# Patient Record
Sex: FEMALE | Race: WHITE | NOT HISPANIC OR LATINO
[De-identification: names, ages, dates, MRNs, and addresses within clinical notes are randomized per-mention and may not be internally consistent; named-entity substitution may affect disease eponyms.]

---

## 2019-04-07 ENCOUNTER — RESULT REVIEW (OUTPATIENT)
Age: 24
End: 2019-04-07

## 2020-02-09 ENCOUNTER — FORM ENCOUNTER (OUTPATIENT)
Age: 25
End: 2020-02-09

## 2020-02-10 ENCOUNTER — TRANSCRIPTION ENCOUNTER (OUTPATIENT)
Age: 25
End: 2020-02-10

## 2020-02-10 ENCOUNTER — APPOINTMENT (OUTPATIENT)
Dept: UROLOGY | Facility: CLINIC | Age: 25
End: 2020-02-10
Payer: COMMERCIAL

## 2020-02-10 ENCOUNTER — APPOINTMENT (OUTPATIENT)
Dept: CT IMAGING | Facility: IMAGING CENTER | Age: 25
End: 2020-02-10
Payer: COMMERCIAL

## 2020-02-10 ENCOUNTER — OUTPATIENT (OUTPATIENT)
Dept: OUTPATIENT SERVICES | Facility: HOSPITAL | Age: 25
LOS: 1 days | End: 2020-02-10
Payer: COMMERCIAL

## 2020-02-10 VITALS
SYSTOLIC BLOOD PRESSURE: 122 MMHG | WEIGHT: 125 LBS | RESPIRATION RATE: 17 BRPM | HEIGHT: 66 IN | BODY MASS INDEX: 20.09 KG/M2 | DIASTOLIC BLOOD PRESSURE: 80 MMHG | TEMPERATURE: 98.2 F | HEART RATE: 78 BPM

## 2020-02-10 DIAGNOSIS — R10.9 UNSPECIFIED ABDOMINAL PAIN: ICD-10-CM

## 2020-02-10 PROCEDURE — 74176 CT ABD & PELVIS W/O CONTRAST: CPT | Mod: 26

## 2020-02-10 PROCEDURE — 74176 CT ABD & PELVIS W/O CONTRAST: CPT

## 2020-02-10 PROCEDURE — 99204 OFFICE O/P NEW MOD 45 MIN: CPT

## 2020-02-10 NOTE — PHYSICAL EXAM
[General Appearance - Well Developed] : well developed [Heart Rate And Rhythm] : Heart rate and rhythm were normal [] : no respiratory distress [Abdomen Soft] : soft [Normal Station and Gait] : the gait and station were normal for the patient's age [Skin Color & Pigmentation] : normal skin color and pigmentation [Oriented To Time, Place, And Person] : oriented to person, place, and time

## 2020-02-12 LAB
APPEARANCE: CLEAR
BACTERIA UR CULT: NORMAL
BACTERIA: ABNORMAL
BILIRUBIN URINE: NEGATIVE
BLOOD URINE: NEGATIVE
COLOR: NORMAL
GLUCOSE QUALITATIVE U: NEGATIVE
HYALINE CASTS: 1 /LPF
KETONES URINE: NEGATIVE
LEUKOCYTE ESTERASE URINE: ABNORMAL
MICROSCOPIC-UA: NORMAL
NITRITE URINE: NEGATIVE
PH URINE: 6.5
PROTEIN URINE: NEGATIVE
RED BLOOD CELLS URINE: 3 /HPF
SPECIFIC GRAVITY URINE: 1.01
SQUAMOUS EPITHELIAL CELLS: 4 /HPF
UROBILINOGEN URINE: NORMAL
WHITE BLOOD CELLS URINE: 6 /HPF

## 2020-02-22 ENCOUNTER — EMERGENCY (EMERGENCY)
Facility: HOSPITAL | Age: 25
LOS: 1 days | Discharge: HOME | End: 2020-02-22
Attending: EMERGENCY MEDICINE
Payer: COMMERCIAL

## 2020-02-22 VITALS
OXYGEN SATURATION: 99 % | DIASTOLIC BLOOD PRESSURE: 80 MMHG | SYSTOLIC BLOOD PRESSURE: 116 MMHG | TEMPERATURE: 98 F | HEART RATE: 89 BPM | RESPIRATION RATE: 18 BRPM

## 2020-02-22 VITALS
HEART RATE: 79 BPM | RESPIRATION RATE: 17 BRPM | DIASTOLIC BLOOD PRESSURE: 74 MMHG | OXYGEN SATURATION: 100 % | WEIGHT: 125 LBS | SYSTOLIC BLOOD PRESSURE: 114 MMHG | TEMPERATURE: 98 F | HEIGHT: 66 IN

## 2020-02-22 LAB
ALBUMIN SERPL ELPH-MCNC: 4.3 G/DL — SIGNIFICANT CHANGE UP (ref 3.3–5)
ALP SERPL-CCNC: 49 U/L — SIGNIFICANT CHANGE UP (ref 40–120)
ALT FLD-CCNC: 25 U/L — SIGNIFICANT CHANGE UP (ref 10–45)
ANION GAP SERPL CALC-SCNC: 15 MMOL/L — SIGNIFICANT CHANGE UP (ref 5–17)
APPEARANCE UR: CLEAR — SIGNIFICANT CHANGE UP
AST SERPL-CCNC: 20 U/L — SIGNIFICANT CHANGE UP (ref 10–40)
BASE EXCESS BLDV CALC-SCNC: -2.2 MMOL/L — LOW (ref -2–2)
BASOPHILS # BLD AUTO: 0.05 K/UL — SIGNIFICANT CHANGE UP (ref 0–0.2)
BASOPHILS NFR BLD AUTO: 0.3 % — SIGNIFICANT CHANGE UP (ref 0–2)
BILIRUB SERPL-MCNC: 0.5 MG/DL — SIGNIFICANT CHANGE UP (ref 0.2–1.2)
BILIRUB UR-MCNC: NEGATIVE — SIGNIFICANT CHANGE UP
BUN SERPL-MCNC: 10 MG/DL — SIGNIFICANT CHANGE UP (ref 7–23)
CA-I SERPL-SCNC: 1.1 MMOL/L — LOW (ref 1.12–1.3)
CALCIUM SERPL-MCNC: 9.3 MG/DL — SIGNIFICANT CHANGE UP (ref 8.4–10.5)
CHLORIDE BLDV-SCNC: 116 MMOL/L — HIGH (ref 96–108)
CHLORIDE SERPL-SCNC: 106 MMOL/L — SIGNIFICANT CHANGE UP (ref 96–108)
CO2 BLDV-SCNC: 25 MMOL/L — SIGNIFICANT CHANGE UP (ref 22–30)
CO2 SERPL-SCNC: 20 MMOL/L — LOW (ref 22–31)
COLOR SPEC: YELLOW — SIGNIFICANT CHANGE UP
CREAT SERPL-MCNC: 0.82 MG/DL — SIGNIFICANT CHANGE UP (ref 0.5–1.3)
DIFF PNL FLD: NEGATIVE — SIGNIFICANT CHANGE UP
EOSINOPHIL # BLD AUTO: 0.04 K/UL — SIGNIFICANT CHANGE UP (ref 0–0.5)
EOSINOPHIL NFR BLD AUTO: 0.2 % — SIGNIFICANT CHANGE UP (ref 0–6)
GAS PNL BLDV: 137 MMOL/L — SIGNIFICANT CHANGE UP (ref 135–145)
GAS PNL BLDV: SIGNIFICANT CHANGE UP
GAS PNL BLDV: SIGNIFICANT CHANGE UP
GLUCOSE BLDV-MCNC: 93 MG/DL — SIGNIFICANT CHANGE UP (ref 70–99)
GLUCOSE SERPL-MCNC: 110 MG/DL — HIGH (ref 70–99)
GLUCOSE UR QL: NEGATIVE — SIGNIFICANT CHANGE UP
HCG SERPL-ACNC: <2 MIU/ML — SIGNIFICANT CHANGE UP
HCO3 BLDV-SCNC: 23 MMOL/L — SIGNIFICANT CHANGE UP (ref 21–29)
HCT VFR BLD CALC: 39.3 % — SIGNIFICANT CHANGE UP (ref 34.5–45)
HCT VFR BLD CALC: 47.4 % — HIGH (ref 34.5–45)
HCT VFR BLDA CALC: 40 % — SIGNIFICANT CHANGE UP (ref 39–50)
HGB BLD CALC-MCNC: 13.1 G/DL — SIGNIFICANT CHANGE UP (ref 11.5–15.5)
HGB BLD-MCNC: 12.7 G/DL — SIGNIFICANT CHANGE UP (ref 11.5–15.5)
HGB BLD-MCNC: 15.1 G/DL — SIGNIFICANT CHANGE UP (ref 11.5–15.5)
IMM GRANULOCYTES NFR BLD AUTO: 0.6 % — SIGNIFICANT CHANGE UP (ref 0–1.5)
KETONES UR-MCNC: ABNORMAL
LACTATE BLDV-MCNC: 1.1 MMOL/L — SIGNIFICANT CHANGE UP (ref 0.7–2)
LEUKOCYTE ESTERASE UR-ACNC: NEGATIVE — SIGNIFICANT CHANGE UP
LYMPHOCYTES # BLD AUTO: 0.84 K/UL — LOW (ref 1–3.3)
LYMPHOCYTES # BLD AUTO: 4.8 % — LOW (ref 13–44)
MCHC RBC-ENTMCNC: 30.9 PG — SIGNIFICANT CHANGE UP (ref 27–34)
MCHC RBC-ENTMCNC: 31.6 PG — SIGNIFICANT CHANGE UP (ref 27–34)
MCHC RBC-ENTMCNC: 31.9 GM/DL — LOW (ref 32–36)
MCHC RBC-ENTMCNC: 32.3 GM/DL — SIGNIFICANT CHANGE UP (ref 32–36)
MCV RBC AUTO: 97.1 FL — SIGNIFICANT CHANGE UP (ref 80–100)
MCV RBC AUTO: 97.8 FL — SIGNIFICANT CHANGE UP (ref 80–100)
MONOCYTES # BLD AUTO: 1.03 K/UL — HIGH (ref 0–0.9)
MONOCYTES NFR BLD AUTO: 5.8 % — SIGNIFICANT CHANGE UP (ref 2–14)
NEUTROPHILS # BLD AUTO: 15.57 K/UL — HIGH (ref 1.8–7.4)
NEUTROPHILS NFR BLD AUTO: 88.3 % — HIGH (ref 43–77)
NITRITE UR-MCNC: NEGATIVE — SIGNIFICANT CHANGE UP
NRBC # BLD: 0 /100 WBCS — SIGNIFICANT CHANGE UP (ref 0–0)
NRBC # BLD: 0 /100 WBCS — SIGNIFICANT CHANGE UP (ref 0–0)
PCO2 BLDV: 45 MMHG — SIGNIFICANT CHANGE UP (ref 35–50)
PH BLDV: 7.33 — LOW (ref 7.35–7.45)
PH UR: 6.5 — SIGNIFICANT CHANGE UP (ref 5–8)
PLATELET # BLD AUTO: 174 K/UL — SIGNIFICANT CHANGE UP (ref 150–400)
PLATELET # BLD AUTO: 250 K/UL — SIGNIFICANT CHANGE UP (ref 150–400)
PO2 BLDV: 30 MMHG — SIGNIFICANT CHANGE UP (ref 25–45)
POTASSIUM BLDV-SCNC: 3.8 MMOL/L — SIGNIFICANT CHANGE UP (ref 3.5–5.3)
POTASSIUM SERPL-MCNC: 4 MMOL/L — SIGNIFICANT CHANGE UP (ref 3.5–5.3)
POTASSIUM SERPL-SCNC: 4 MMOL/L — SIGNIFICANT CHANGE UP (ref 3.5–5.3)
PROT SERPL-MCNC: 7.7 G/DL — SIGNIFICANT CHANGE UP (ref 6–8.3)
PROT UR-MCNC: SIGNIFICANT CHANGE UP
RBC # BLD: 4.02 M/UL — SIGNIFICANT CHANGE UP (ref 3.8–5.2)
RBC # BLD: 4.88 M/UL — SIGNIFICANT CHANGE UP (ref 3.8–5.2)
RBC # FLD: 13 % — SIGNIFICANT CHANGE UP (ref 10.3–14.5)
RBC # FLD: 13.1 % — SIGNIFICANT CHANGE UP (ref 10.3–14.5)
SAO2 % BLDV: 46 % — LOW (ref 67–88)
SODIUM SERPL-SCNC: 141 MMOL/L — SIGNIFICANT CHANGE UP (ref 135–145)
SP GR SPEC: 1.02 — SIGNIFICANT CHANGE UP (ref 1.01–1.02)
UROBILINOGEN FLD QL: NEGATIVE — SIGNIFICANT CHANGE UP
WBC # BLD: 11.44 K/UL — HIGH (ref 3.8–10.5)
WBC # BLD: 17.63 K/UL — HIGH (ref 3.8–10.5)
WBC # FLD AUTO: 11.44 K/UL — HIGH (ref 3.8–10.5)
WBC # FLD AUTO: 17.63 K/UL — HIGH (ref 3.8–10.5)

## 2020-02-22 PROCEDURE — 82803 BLOOD GASES ANY COMBINATION: CPT

## 2020-02-22 PROCEDURE — 82565 ASSAY OF CREATININE: CPT

## 2020-02-22 PROCEDURE — 82435 ASSAY OF BLOOD CHLORIDE: CPT

## 2020-02-22 PROCEDURE — 84132 ASSAY OF SERUM POTASSIUM: CPT

## 2020-02-22 PROCEDURE — 82947 ASSAY GLUCOSE BLOOD QUANT: CPT

## 2020-02-22 PROCEDURE — 84702 CHORIONIC GONADOTROPIN TEST: CPT

## 2020-02-22 PROCEDURE — 99284 EMERGENCY DEPT VISIT MOD MDM: CPT | Mod: 25

## 2020-02-22 PROCEDURE — 99284 EMERGENCY DEPT VISIT MOD MDM: CPT

## 2020-02-22 PROCEDURE — 96361 HYDRATE IV INFUSION ADD-ON: CPT

## 2020-02-22 PROCEDURE — 81003 URINALYSIS AUTO W/O SCOPE: CPT

## 2020-02-22 PROCEDURE — 82330 ASSAY OF CALCIUM: CPT

## 2020-02-22 PROCEDURE — 96374 THER/PROPH/DIAG INJ IV PUSH: CPT

## 2020-02-22 PROCEDURE — 83690 ASSAY OF LIPASE: CPT

## 2020-02-22 PROCEDURE — 80053 COMPREHEN METABOLIC PANEL: CPT

## 2020-02-22 PROCEDURE — 85027 COMPLETE CBC AUTOMATED: CPT

## 2020-02-22 PROCEDURE — 84295 ASSAY OF SERUM SODIUM: CPT

## 2020-02-22 PROCEDURE — 85014 HEMATOCRIT: CPT

## 2020-02-22 PROCEDURE — 83605 ASSAY OF LACTIC ACID: CPT

## 2020-02-22 RX ORDER — ONDANSETRON 8 MG/1
4 TABLET, FILM COATED ORAL ONCE
Refills: 0 | Status: COMPLETED | OUTPATIENT
Start: 2020-02-22 | End: 2020-02-22

## 2020-02-22 RX ORDER — ACETAMINOPHEN 500 MG
975 TABLET ORAL ONCE
Refills: 0 | Status: COMPLETED | OUTPATIENT
Start: 2020-02-22 | End: 2020-02-22

## 2020-02-22 RX ORDER — SODIUM CHLORIDE 9 MG/ML
1000 INJECTION INTRAMUSCULAR; INTRAVENOUS; SUBCUTANEOUS ONCE
Refills: 0 | Status: COMPLETED | OUTPATIENT
Start: 2020-02-22 | End: 2020-02-22

## 2020-02-22 RX ADMIN — Medication 975 MILLIGRAM(S): at 08:21

## 2020-02-22 RX ADMIN — Medication 20 MILLIGRAM(S): at 07:51

## 2020-02-22 RX ADMIN — SODIUM CHLORIDE 1000 MILLILITER(S): 9 INJECTION INTRAMUSCULAR; INTRAVENOUS; SUBCUTANEOUS at 08:52

## 2020-02-22 RX ADMIN — SODIUM CHLORIDE 1000 MILLILITER(S): 9 INJECTION INTRAMUSCULAR; INTRAVENOUS; SUBCUTANEOUS at 11:42

## 2020-02-22 RX ADMIN — ONDANSETRON 4 MILLIGRAM(S): 8 TABLET, FILM COATED ORAL at 07:51

## 2020-02-22 RX ADMIN — Medication 975 MILLIGRAM(S): at 07:51

## 2020-02-22 RX ADMIN — SODIUM CHLORIDE 1000 MILLILITER(S): 9 INJECTION INTRAMUSCULAR; INTRAVENOUS; SUBCUTANEOUS at 09:46

## 2020-02-22 RX ADMIN — SODIUM CHLORIDE 1000 MILLILITER(S): 9 INJECTION INTRAMUSCULAR; INTRAVENOUS; SUBCUTANEOUS at 07:52

## 2020-02-22 RX ADMIN — SODIUM CHLORIDE 1000 MILLILITER(S): 9 INJECTION INTRAMUSCULAR; INTRAVENOUS; SUBCUTANEOUS at 10:48

## 2020-02-22 RX ADMIN — SODIUM CHLORIDE 1000 MILLILITER(S): 9 INJECTION INTRAMUSCULAR; INTRAVENOUS; SUBCUTANEOUS at 12:31

## 2020-02-22 NOTE — ED ADULT TRIAGE NOTE - IDEAL BODY WEIGHT(KG)
TRANSFER - OUT REPORT:    Verbal report given to Waqar Fraire RN(name) on Kiara Sterling  being transferred to [facility](unit) for routine post - op       Report consisted of patients Situation, Background, Assessment and   Recommendations(SBAR). Information from the following report(s) SBAR, Kardex, OR Summary, Intake/Output and MAR was reviewed with the receiving nurse. Opportunity for questions and clarification was provided.       Patient transported with:   O2 @ 2 liters  Registered Nurse  [facility]

## 2020-02-22 NOTE — ED PROVIDER NOTE - PROGRESS NOTE DETAILS
Pt states feels  mod improved. No tenderness. Had recent ct stone hunt for flank pain which is not active, showing ovarian cysts. No pelvic pain today. no pelvic/supra pubic ttp currently  Carlos Alberto Geronimo MD, Facep

## 2020-02-22 NOTE — ED PROVIDER NOTE - OBJECTIVE STATEMENT
24y f no sig PMhx p/w vomiting and diarrhea for the last 6 hrs, sx began acutely in middle of night waking pt from sleep and have been persistent all night. Symptoms now beginning to resolve. Pt reports getting japanese food last night. Denies fevers or chills. Pt reports associated crampy abd. pain during episodes of diarrhea and vomiting but no pain at this time.

## 2020-02-22 NOTE — ED PROVIDER NOTE - PATIENT PORTAL LINK FT
You can access the FollowMyHealth Patient Portal offered by [facility] by registering at the following website: [email or web address]. By joining [facility]’s FollowMyHealth portal, you will also be able to view your health information using other applications (apps) compatible with our system.

## 2020-02-22 NOTE — ED PROVIDER NOTE - NSFOLLOWUPINSTRUCTIONS_ED_ALL_ED_FT
YOU LIKELY HAD A VIRAL GASTROENTERITIS AKA FOOD POISONING    THESE PASS ON THEIR OWN IN ABOUT 24 HRS    REMEMBER TO DRINK LOTS OF WATER/GATORADE IN ORDER TO AVOID DEHYDRATION     TRY TO EAT LIGHT AND DRY FOOD OVER THE NEXT 8-12 HRS SUCH AS TOAST AND OATMEAL    AVOID GREASY OR SPICY FOOD FOR THE NEXT 12-24 HRS

## 2020-02-22 NOTE — ED PROVIDER NOTE - CLINICAL SUMMARY MEDICAL DECISION MAKING FREE TEXT BOX
24y f no sig PMhx p/w n/v/d for the last 6 hrs, sx began acutely in middle of night waking pt from sleep, sx begin to resolve now, pt reports eating out last night. High susp. for gastroenteritis, abd exam benign without tenderness or focality, no susp for appy/choly/pancreatitis, no lower abdominal pain, no susp for OB etiology, will assess basic labs and hcg, admin fluids and sx control, anticipate dc home -[facility]

## 2020-02-22 NOTE — ED ADULT NURSE NOTE - OBJECTIVE STATEMENT
24 year old female presents to the ED via walk in with c/o N/V/D since 1am. Last PO intake was japanese food approx 7pm last night. Since then, approx 10-12 episodes vomiting and diarrhea with mild abd cramping. Abd non-distended, non-tender. Denies blood in vomit/stool. No PMH/PSH. Skin pink, mucosa moist, ambulatory with no assist. Denies CP, SOB, fever or chills. Comfort and safety measures maintained.

## 2020-02-22 NOTE — ED PROVIDER NOTE - ATTENDING CONTRIBUTION TO CARE
Private Physician Filiberto Rodriguez pcp  24y female pmh neg no dm,htn,hld, cancer,cad,cva,habits,travel. Pt employed as . Pt comes to ed 1am NVD, No fever, cough, dysuria, hematuria, no illl contacts. +flu vaccine this season. No abd surg. Not pregnant.,no vag dc. LMP approx 1m ago normal Currently on ocp. Pe well developed and well nourished female looking mildly ill normocephalic atraumatic neck supple chest clear anterior & posterior abd soft +bs no mass guarding, cvat, scars,rash. neuro no focal defects cv no rubs, gallops or murmurs  Carlos Alberto Geronimo MD, Facep

## 2020-02-24 ENCOUNTER — NON-APPOINTMENT (OUTPATIENT)
Age: 25
End: 2020-02-24

## 2020-02-24 ENCOUNTER — LABORATORY RESULT (OUTPATIENT)
Age: 25
End: 2020-02-24

## 2020-02-24 ENCOUNTER — APPOINTMENT (OUTPATIENT)
Dept: INTERNAL MEDICINE | Facility: CLINIC | Age: 25
End: 2020-02-24
Payer: COMMERCIAL

## 2020-02-24 VITALS
WEIGHT: 120 LBS | BODY MASS INDEX: 19.29 KG/M2 | DIASTOLIC BLOOD PRESSURE: 70 MMHG | HEIGHT: 66 IN | SYSTOLIC BLOOD PRESSURE: 112 MMHG

## 2020-02-24 DIAGNOSIS — N83.201 UNSPECIFIED OVARIAN CYST, RIGHT SIDE: ICD-10-CM

## 2020-02-24 DIAGNOSIS — Z84.1 FAMILY HISTORY OF DISORDERS OF KIDNEY AND URETER: ICD-10-CM

## 2020-02-24 PROCEDURE — 99385 PREV VISIT NEW AGE 18-39: CPT | Mod: 25

## 2020-02-24 PROCEDURE — 93000 ELECTROCARDIOGRAM COMPLETE: CPT

## 2020-02-24 PROCEDURE — 36415 COLL VENOUS BLD VENIPUNCTURE: CPT

## 2020-02-24 PROCEDURE — 81003 URINALYSIS AUTO W/O SCOPE: CPT | Mod: QW

## 2020-02-24 NOTE — HEALTH RISK ASSESSMENT
[Very Good] : ~his/her~  mood as very good [Yes] : Yes [0] : 1) Little interest or pleasure doing things: Not at all (0) [None] : None [With Family] : lives with family [Graduate School] : graduate school [Significant Other] : lives with significant other [Sexually Active] : sexually active [Feels Safe at Home] : Feels safe at home [Fully functional (bathing, dressing, toileting, transferring, walking, feeding)] : Fully functional (bathing, dressing, toileting, transferring, walking, feeding) [Fully functional (using the telephone, shopping, preparing meals, housekeeping, doing laundry, using] : Fully functional and needs no help or supervision to perform IADLs (using the telephone, shopping, preparing meals, housekeeping, doing laundry, using transportation, managing medications and managing finances) [Smoke Detector] : smoke detector [Carbon Monoxide Detector] : carbon monoxide detector [Seat Belt] :  uses seat belt [Sunscreen] : uses sunscreen [] : No [Reports changes in hearing] : Reports no changes in hearing [Change in mental status noted] : No change in mental status noted [Reports changes in vision] : Reports no changes in vision [Guns at Home] : no guns at home [Reports changes in dental health] : Reports no changes in dental health [TB Exposure] : is not being exposed to tuberculosis [Travel to Developing Areas] : does not  travel to developing areas [Safety elements used in home] : no safety elements used in home [Caregiver Concerns] : does not have caregiver concerns

## 2020-02-24 NOTE — PHYSICAL EXAM
[No Acute Distress] : no acute distress [Well Nourished] : well nourished [Well Developed] : well developed [Well-Appearing] : well-appearing [Normal Sclera/Conjunctiva] : normal sclera/conjunctiva [PERRL] : pupils equal round and reactive to light [EOMI] : extraocular movements intact [Normal Outer Ear/Nose] : the outer ears and nose were normal in appearance [Normal Oropharynx] : the oropharynx was normal [No JVD] : no jugular venous distention [No Lymphadenopathy] : no lymphadenopathy [Supple] : supple [Thyroid Normal, No Nodules] : the thyroid was normal and there were no nodules present [No Respiratory Distress] : no respiratory distress  [No Accessory Muscle Use] : no accessory muscle use [Clear to Auscultation] : lungs were clear to auscultation bilaterally [Normal Rate] : normal rate  [Regular Rhythm] : with a regular rhythm [Normal S1, S2] : normal S1 and S2 [No Murmur] : no murmur heard [No Carotid Bruits] : no carotid bruits [No Abdominal Bruit] : a ~M bruit was not heard ~T in the abdomen [No Varicosities] : no varicosities [Pedal Pulses Present] : the pedal pulses are present [No Edema] : there was no peripheral edema [No Palpable Aorta] : no palpable aorta [Normal Appearance] : normal in appearance [No Extremity Clubbing/Cyanosis] : no extremity clubbing/cyanosis [No Nipple Discharge] : no nipple discharge [No Axillary Lymphadenopathy] : no axillary lymphadenopathy [Soft] : abdomen soft [Non Tender] : non-tender [Non-distended] : non-distended [No Masses] : no abdominal mass palpated [No HSM] : no HSM [Normal Bowel Sounds] : normal bowel sounds [Normal Posterior Cervical Nodes] : no posterior cervical lymphadenopathy [Normal Anterior Cervical Nodes] : no anterior cervical lymphadenopathy [No Spinal Tenderness] : no spinal tenderness [No CVA Tenderness] : no CVA  tenderness [No Joint Swelling] : no joint swelling [Grossly Normal Strength/Tone] : grossly normal strength/tone [No Rash] : no rash [No Focal Deficits] : no focal deficits [Coordination Grossly Intact] : coordination grossly intact [Normal Affect] : the affect was normal [Normal Gait] : normal gait [Deep Tendon Reflexes (DTR)] : deep tendon reflexes were 2+ and symmetric [Normal Insight/Judgement] : insight and judgment were intact

## 2020-02-24 NOTE — HISTORY OF PRESENT ILLNESS
[FreeTextEntry1] : This is a 24-year-old female for annual health assessment [de-identified] : Patient's health has been good. She did have an episode of gastroenteritis this weekend for which she received intravenous fluids\par \par She has a history of right flank and abdominal pain. She was seen by urology a CT was negative except for ovarian cysts\par \par She sees OB/GYN and has all serologic testing done through them

## 2020-02-24 NOTE — ASSESSMENT
[FreeTextEntry1] : This is a 24-year-old female for annual health assessment.\par \par Her overall health has been good except for an episode of gastroenteritis which was recent and 4 relatively chronic right-sided abdominal/pelvic pain\par \par EKG is normal\par \par I did review her CAT scan which did show what seemed to be possible hemorrhagic cyst she will be pursuing this with OB/GYN she will be having an ultrasound this afternoon.\par \par Her physical examination and review of systems is otherwise negative\par \par She seems quite healthy

## 2020-02-25 LAB
25(OH)D3 SERPL-MCNC: 48.5 NG/ML
ALBUMIN SERPL ELPH-MCNC: 4.1 G/DL
ALP BLD-CCNC: 42 U/L
ALT SERPL-CCNC: 30 U/L
ANION GAP SERPL CALC-SCNC: 12 MMOL/L
AST SERPL-CCNC: 22 U/L
BASOPHILS # BLD AUTO: 0.04 K/UL
BASOPHILS NFR BLD AUTO: 0.7 %
BILIRUB SERPL-MCNC: 0.2 MG/DL
BUN SERPL-MCNC: 6 MG/DL
CALCIUM SERPL-MCNC: 9.2 MG/DL
CHLORIDE SERPL-SCNC: 106 MMOL/L
CHOLEST SERPL-MCNC: 119 MG/DL
CHOLEST/HDLC SERPL: 2.6 RATIO
CO2 SERPL-SCNC: 24 MMOL/L
CREAT SERPL-MCNC: 0.73 MG/DL
EOSINOPHIL # BLD AUTO: 0.07 K/UL
EOSINOPHIL NFR BLD AUTO: 1.2 %
ESTIMATED AVERAGE GLUCOSE: 100 MG/DL
GLUCOSE SERPL-MCNC: 84 MG/DL
HBA1C MFR BLD HPLC: 5.1 %
HCT VFR BLD CALC: 42.9 %
HDLC SERPL-MCNC: 47 MG/DL
HGB BLD-MCNC: 13.9 G/DL
IMM GRANULOCYTES NFR BLD AUTO: 0.5 %
LDLC SERPL CALC-MCNC: 48 MG/DL
LYMPHOCYTES # BLD AUTO: 1.83 K/UL
LYMPHOCYTES NFR BLD AUTO: 32.4 %
MAN DIFF?: NORMAL
MCHC RBC-ENTMCNC: 31.8 PG
MCHC RBC-ENTMCNC: 32.4 GM/DL
MCV RBC AUTO: 98.2 FL
MONOCYTES # BLD AUTO: 0.84 K/UL
MONOCYTES NFR BLD AUTO: 14.9 %
NEUTROPHILS # BLD AUTO: 2.83 K/UL
NEUTROPHILS NFR BLD AUTO: 50.3 %
PLATELET # BLD AUTO: 201 K/UL
POTASSIUM SERPL-SCNC: 4.2 MMOL/L
PROT SERPL-MCNC: 6.8 G/DL
RBC # BLD: 4.37 M/UL
RBC # FLD: 13.3 %
SAVE SPECIMEN: NORMAL
SODIUM SERPL-SCNC: 143 MMOL/L
T3RU NFR SERPL: 1.1 TBI
T4 SERPL-MCNC: 8.4 UG/DL
TRIGL SERPL-MCNC: 120 MG/DL
TSH SERPL-ACNC: 2.3 UIU/ML
URATE SERPL-MCNC: 3.8 MG/DL
WBC # FLD AUTO: 5.64 K/UL

## 2020-02-29 NOTE — HISTORY OF PRESENT ILLNESS
[Flank Pain] : flank pain [FreeTextEntry1] : 23 yo female presents with Right flank pain since 12/2019, 6/10 pain, intermittent. Pt denies hematuria, frequency, urgency. Pt drinks 18 oz water bottle x 2-3 daily.  [Urinary Urgency] : no urinary urgency [Urinary Frequency] : no urinary frequency [Fatigue] : no fatigue [Nausea] : no nausea

## 2020-04-25 ENCOUNTER — TRANSCRIPTION ENCOUNTER (OUTPATIENT)
Age: 25
End: 2020-04-25

## 2020-04-26 ENCOUNTER — RESULT REVIEW (OUTPATIENT)
Age: 25
End: 2020-04-26

## 2020-04-26 ENCOUNTER — INPATIENT (INPATIENT)
Facility: HOSPITAL | Age: 25
LOS: 0 days | Discharge: HOME | DRG: 743 | End: 2020-04-26
Attending: OBSTETRICS & GYNECOLOGY | Admitting: OBSTETRICS & GYNECOLOGY
Payer: COMMERCIAL

## 2020-04-26 VITALS
TEMPERATURE: 99 F | RESPIRATION RATE: 18 BRPM | DIASTOLIC BLOOD PRESSURE: 64 MMHG | SYSTOLIC BLOOD PRESSURE: 114 MMHG | HEART RATE: 87 BPM | OXYGEN SATURATION: 100 %

## 2020-04-26 VITALS
WEIGHT: 119.93 LBS | SYSTOLIC BLOOD PRESSURE: 101 MMHG | HEIGHT: 66 IN | HEART RATE: 68 BPM | DIASTOLIC BLOOD PRESSURE: 53 MMHG | OXYGEN SATURATION: 100 % | RESPIRATION RATE: 16 BRPM | TEMPERATURE: 98 F

## 2020-04-26 DIAGNOSIS — N83.519 TORSION OF OVARY AND OVARIAN PEDICLE, UNSPECIFIED SIDE: ICD-10-CM

## 2020-04-26 LAB
ALBUMIN SERPL ELPH-MCNC: 4.4 G/DL — SIGNIFICANT CHANGE UP (ref 3.3–5)
ALP SERPL-CCNC: 51 U/L — SIGNIFICANT CHANGE UP (ref 40–120)
ALT FLD-CCNC: 24 U/L — SIGNIFICANT CHANGE UP (ref 10–45)
ANION GAP SERPL CALC-SCNC: 13 MMOL/L — SIGNIFICANT CHANGE UP (ref 5–17)
APTT BLD: 25.7 SEC — LOW (ref 27.5–36.3)
AST SERPL-CCNC: 17 U/L — SIGNIFICANT CHANGE UP (ref 10–40)
BASOPHILS # BLD AUTO: 0.09 K/UL — SIGNIFICANT CHANGE UP (ref 0–0.2)
BASOPHILS NFR BLD AUTO: 0.8 % — SIGNIFICANT CHANGE UP (ref 0–2)
BILIRUB SERPL-MCNC: 0.4 MG/DL — SIGNIFICANT CHANGE UP (ref 0.2–1.2)
BLD GP AB SCN SERPL QL: NEGATIVE — SIGNIFICANT CHANGE UP
BUN SERPL-MCNC: 15 MG/DL — SIGNIFICANT CHANGE UP (ref 7–23)
CALCIUM SERPL-MCNC: 9.4 MG/DL — SIGNIFICANT CHANGE UP (ref 8.4–10.5)
CHLORIDE SERPL-SCNC: 102 MMOL/L — SIGNIFICANT CHANGE UP (ref 96–108)
CO2 SERPL-SCNC: 23 MMOL/L — SIGNIFICANT CHANGE UP (ref 22–31)
CREAT SERPL-MCNC: 0.82 MG/DL — SIGNIFICANT CHANGE UP (ref 0.5–1.3)
EOSINOPHIL # BLD AUTO: 0.11 K/UL — SIGNIFICANT CHANGE UP (ref 0–0.5)
EOSINOPHIL NFR BLD AUTO: 1 % — SIGNIFICANT CHANGE UP (ref 0–6)
GLUCOSE SERPL-MCNC: 98 MG/DL — SIGNIFICANT CHANGE UP (ref 70–99)
HCG SERPL-ACNC: <2 MIU/ML — SIGNIFICANT CHANGE UP
HCT VFR BLD CALC: 42.7 % — SIGNIFICANT CHANGE UP (ref 34.5–45)
HGB BLD-MCNC: 14.3 G/DL — SIGNIFICANT CHANGE UP (ref 11.5–15.5)
IMM GRANULOCYTES NFR BLD AUTO: 0.4 % — SIGNIFICANT CHANGE UP (ref 0–1.5)
INR BLD: 1.11 RATIO — SIGNIFICANT CHANGE UP (ref 0.88–1.16)
LYMPHOCYTES # BLD AUTO: 29.6 % — SIGNIFICANT CHANGE UP (ref 13–44)
LYMPHOCYTES # BLD AUTO: 3.35 K/UL — HIGH (ref 1–3.3)
MCHC RBC-ENTMCNC: 32.2 PG — SIGNIFICANT CHANGE UP (ref 27–34)
MCHC RBC-ENTMCNC: 33.5 GM/DL — SIGNIFICANT CHANGE UP (ref 32–36)
MCV RBC AUTO: 96.2 FL — SIGNIFICANT CHANGE UP (ref 80–100)
MONOCYTES # BLD AUTO: 0.56 K/UL — SIGNIFICANT CHANGE UP (ref 0–0.9)
MONOCYTES NFR BLD AUTO: 4.9 % — SIGNIFICANT CHANGE UP (ref 2–14)
NEUTROPHILS # BLD AUTO: 7.17 K/UL — SIGNIFICANT CHANGE UP (ref 1.8–7.4)
NEUTROPHILS NFR BLD AUTO: 63.3 % — SIGNIFICANT CHANGE UP (ref 43–77)
NRBC # BLD: 0 /100 WBCS — SIGNIFICANT CHANGE UP (ref 0–0)
PLATELET # BLD AUTO: 216 K/UL — SIGNIFICANT CHANGE UP (ref 150–400)
POTASSIUM SERPL-MCNC: 3.7 MMOL/L — SIGNIFICANT CHANGE UP (ref 3.5–5.3)
POTASSIUM SERPL-SCNC: 3.7 MMOL/L — SIGNIFICANT CHANGE UP (ref 3.5–5.3)
PROT SERPL-MCNC: 7.8 G/DL — SIGNIFICANT CHANGE UP (ref 6–8.3)
PROTHROM AB SERPL-ACNC: 12.8 SEC — SIGNIFICANT CHANGE UP (ref 10–12.9)
RBC # BLD: 4.44 M/UL — SIGNIFICANT CHANGE UP (ref 3.8–5.2)
RBC # FLD: 12.5 % — SIGNIFICANT CHANGE UP (ref 10.3–14.5)
RH IG SCN BLD-IMP: POSITIVE — SIGNIFICANT CHANGE UP
SARS-COV-2 RNA SPEC QL NAA+PROBE: SIGNIFICANT CHANGE UP
SODIUM SERPL-SCNC: 138 MMOL/L — SIGNIFICANT CHANGE UP (ref 135–145)
WBC # BLD: 11.32 K/UL — HIGH (ref 3.8–10.5)
WBC # FLD AUTO: 11.32 K/UL — HIGH (ref 3.8–10.5)

## 2020-04-26 PROCEDURE — 76705 ECHO EXAM OF ABDOMEN: CPT | Mod: 26

## 2020-04-26 PROCEDURE — 93975 VASCULAR STUDY: CPT | Mod: 26

## 2020-04-26 PROCEDURE — 88305 TISSUE EXAM BY PATHOLOGIST: CPT

## 2020-04-26 PROCEDURE — 99285 EMERGENCY DEPT VISIT HI MDM: CPT

## 2020-04-26 PROCEDURE — 88302 TISSUE EXAM BY PATHOLOGIST: CPT | Mod: 26

## 2020-04-26 PROCEDURE — 88305 TISSUE EXAM BY PATHOLOGIST: CPT | Mod: 26

## 2020-04-26 PROCEDURE — 76830 TRANSVAGINAL US NON-OB: CPT

## 2020-04-26 PROCEDURE — 85610 PROTHROMBIN TIME: CPT

## 2020-04-26 PROCEDURE — 85027 COMPLETE CBC AUTOMATED: CPT

## 2020-04-26 PROCEDURE — 76830 TRANSVAGINAL US NON-OB: CPT | Mod: 26

## 2020-04-26 PROCEDURE — 84702 CHORIONIC GONADOTROPIN TEST: CPT

## 2020-04-26 PROCEDURE — 76705 ECHO EXAM OF ABDOMEN: CPT

## 2020-04-26 PROCEDURE — 88304 TISSUE EXAM BY PATHOLOGIST: CPT | Mod: 26

## 2020-04-26 PROCEDURE — 86901 BLOOD TYPING SEROLOGIC RH(D): CPT

## 2020-04-26 PROCEDURE — 86900 BLOOD TYPING SEROLOGIC ABO: CPT

## 2020-04-26 PROCEDURE — 88304 TISSUE EXAM BY PATHOLOGIST: CPT

## 2020-04-26 PROCEDURE — 86850 RBC ANTIBODY SCREEN: CPT

## 2020-04-26 PROCEDURE — 93975 VASCULAR STUDY: CPT

## 2020-04-26 PROCEDURE — 80053 COMPREHEN METABOLIC PANEL: CPT

## 2020-04-26 PROCEDURE — 85730 THROMBOPLASTIN TIME PARTIAL: CPT

## 2020-04-26 PROCEDURE — 88302 TISSUE EXAM BY PATHOLOGIST: CPT

## 2020-04-26 RX ORDER — ACETAMINOPHEN 500 MG
650 TABLET ORAL EVERY 6 HOURS
Refills: 0 | Status: DISCONTINUED | OUTPATIENT
Start: 2020-04-26 | End: 2020-04-26

## 2020-04-26 RX ORDER — ONDANSETRON 8 MG/1
4 TABLET, FILM COATED ORAL ONCE
Refills: 0 | Status: DISCONTINUED | OUTPATIENT
Start: 2020-04-26 | End: 2020-04-26

## 2020-04-26 RX ORDER — OXYCODONE HYDROCHLORIDE 5 MG/1
1 TABLET ORAL
Qty: 8 | Refills: 0
Start: 2020-04-26

## 2020-04-26 RX ORDER — HYDROMORPHONE HYDROCHLORIDE 2 MG/ML
0.25 INJECTION INTRAMUSCULAR; INTRAVENOUS; SUBCUTANEOUS
Refills: 0 | Status: DISCONTINUED | OUTPATIENT
Start: 2020-04-26 | End: 2020-04-26

## 2020-04-26 RX ORDER — SODIUM CHLORIDE 9 MG/ML
1000 INJECTION, SOLUTION INTRAVENOUS
Refills: 0 | Status: DISCONTINUED | OUTPATIENT
Start: 2020-04-26 | End: 2020-04-26

## 2020-04-26 RX ORDER — ACETAMINOPHEN 500 MG
1000 TABLET ORAL ONCE
Refills: 0 | Status: COMPLETED | OUTPATIENT
Start: 2020-04-26 | End: 2020-04-26

## 2020-04-26 RX ORDER — IBUPROFEN 200 MG
600 TABLET ORAL EVERY 6 HOURS
Refills: 0 | Status: DISCONTINUED | OUTPATIENT
Start: 2020-04-26 | End: 2020-04-26

## 2020-04-26 RX ORDER — MORPHINE SULFATE 50 MG/1
2 CAPSULE, EXTENDED RELEASE ORAL ONCE
Refills: 0 | Status: DISCONTINUED | OUTPATIENT
Start: 2020-04-26 | End: 2020-04-26

## 2020-04-26 RX ORDER — SODIUM CHLORIDE 9 MG/ML
1000 INJECTION INTRAMUSCULAR; INTRAVENOUS; SUBCUTANEOUS ONCE
Refills: 0 | Status: COMPLETED | OUTPATIENT
Start: 2020-04-26 | End: 2020-04-26

## 2020-04-26 RX ADMIN — SODIUM CHLORIDE 1000 MILLILITER(S): 9 INJECTION INTRAMUSCULAR; INTRAVENOUS; SUBCUTANEOUS at 11:50

## 2020-04-26 RX ADMIN — Medication 400 MILLIGRAM(S): at 17:07

## 2020-04-26 RX ADMIN — SODIUM CHLORIDE 1000 MILLILITER(S): 9 INJECTION INTRAMUSCULAR; INTRAVENOUS; SUBCUTANEOUS at 09:00

## 2020-04-26 NOTE — H&P ADULT - ATTENDING COMMENTS
Agree with resident's note above.  Patient seen at bedside. Pain currently improved however given clinical picture of worsening pain and intermittent improvement, highly suggestive of ovarian torsion. In addition, TVUS demonstrating:    Enlarged right ovary containing 2 large ovarian cysts demonstrating whorling pattern on color Doppler suggestive of partial/intermittent ovarian torsion.     Patient consented for Diagnostic laparoscopy, ovarian detorsion, ovarian cystectomy, possible salpingectomy. All risks and questions discussed with patient and her .    amalia curran

## 2020-04-26 NOTE — BRIEF OPERATIVE NOTE - NSICDXBRIEFPOSTOP_GEN_ALL_CORE_FT
POST-OP DIAGNOSIS:  Cyst of fallopian tube 26-Apr-2020 15:57:56  Gloria Wright  Torsion of fallopian tube 26-Apr-2020 15:57:29  Gloria Wright

## 2020-04-26 NOTE — ED PROVIDER NOTE - OBJECTIVE STATEMENT
Attending Roro Spaulding: 25 y/o female h/o ovarian cyst followed by GYN with reports of 2 cyst on her right ovary measuring approximately 8cm presenting with RLQ pain. pt states pain similar to cyst pain but sig worse. pt states this am had severe pain to right lower quadrant and felt as if nearly passed out. did have a little blood in her underwear but reportedly finished menstrual cycle yesterday. denies any h/o STD. is on birth control. no fevers or chills. +nausea associated with pain. no vomiting. last po yesterday. pain somewhat improved since starting but still present. no back pain.

## 2020-04-26 NOTE — ASU DISCHARGE PLAN (ADULT/PEDIATRIC) - NURSING INSTRUCTIONS
Tylenol/Acetaminophen--------------------AND/OR------------------------Motrin/ibuprofen as needed for pain/discomfort.  NEXT DOSE of Tylenol  OK @ 1100pm this evening, if needed.  NEXT DOSE of Motrin OK @ 9:00pm, if needed.  Follow up with MD as requested; call office for appointment.

## 2020-04-26 NOTE — H&P ADULT - ASSESSMENT
23yo G0 LMP 4/20/20 with h/o ovarian cysts now with intermittent RLQ pain concerning for possible torsion. Ultrasound also with findings suggestive of intermittent torsion.  Patient otherwise afebrile and hemodynamically stable. Last ate/drank last night.

## 2020-04-26 NOTE — ED PROVIDER NOTE - CLINICAL SUMMARY MEDICAL DECISION MAKING FREE TEXT BOX
Attending Roro Spaulding: 23 y/o female presenting with RLQ pain. concern for ovarian torsion based on history of cyst and description of pain. afebrile. history more consistent with cyst pain. less likely acute appendicitis. denies any possibility of pregnancy. will obtain labs, tvus, d/w OB. Attending Roro Spaulding: 25 y/o female presenting with RLQ pain. concern for ovarian torsion based on history of cyst and description of pain. pocus shows right ovarian cysts and small ff. pt taken immediately for transvaginal ultrasound.  history more consistent with cyst pain. less likely acute appendicitis. denies any possibility of pregnancy but will send HCG. ultrasound tech called for tvus, pre op labs sent and will d/w OB as concerned for intermittent torsion

## 2020-04-26 NOTE — ASU DISCHARGE PLAN (ADULT/PEDIATRIC) - CALL YOUR DOCTOR IF YOU HAVE ANY OF THE FOLLOWING:
Nausea and vomiting that does not stop/Unable to urinate/Fever greater than (need to indicate Fahrenheit or Celsius)/Inability to tolerate liquids or foods/Pain not relieved by Medications/Numbness, tingling, color or temperature change to extremity

## 2020-04-26 NOTE — H&P ADULT - NSHPLABSRESULTS_GEN_ALL_CORE
RADIOLOGY    < from: US Doppler Pelvis (04.26.20 @ 09:53) >    FINDINGS:    The exam is limited secondary to pain.    Uterus: 7.1 x 3.6 x 5.1 cm. Within normal limits.    Endometrium: 6 mm. Within normal limits.    Right ovary: 6.7 x 3.3 x 6.0  cm. Two large ovarian cysts measuring 3.5 x 2.6 x 4.5 cm and 3.6 x 3.6 x 4.3 cm, not significantly changed as compared to prior CT abdomen and pelvis of 2/10/2020. No solid component is identified within the cysts. There is Arterial and venous waveforms are visualized, however there is swirling pattern of the ovarian vein on color Doppler which raises the possibility of intermittent/partial torsion.    Left ovary: 2.6 x 0.9 x 2.3 cm. Within normal limits. Normal arterial and venous waveforms.    Fluid: None.    IMPRESSION:     Limited exam secondary to pain.    Enlarged right ovary containing 2 large ovarian cysts demonstrating whorling pattern on color Doppler suggestive of partial/intermittent ovarian torsion.     < end of copied text >

## 2020-04-26 NOTE — H&P ADULT - PROBLEM SELECTOR PLAN 1
- patient consented for diagnostic laparoscopy, possible adnexal detorsion, possible unilateral salpingectomy, and ovarian cystectomy. Patient accepts blood transfusion as indicated.   Consents signed with witness present  - NPO/IVF  - preop labs in chart  - OR informed, pt to be added on as emergent    counseled with Dr. Shankar Wright, R4

## 2020-04-26 NOTE — ED PROVIDER NOTE - PROGRESS NOTE DETAILS
Elizabeth Goldberger PGY-3: gyn exam (chaperone Skyla Santos RN) w/ small dark blood in canal and r adnexal ttp. No discharge Attending Roro Spaulding: u/s concerning for torsion OB paged Elizabeth Goldberger PGY-3: pt to go to OR Elizabeth Goldberger PGY-3: pt to go to OR but will need covid results prior to or per pandemic policy

## 2020-04-26 NOTE — BRIEF OPERATIVE NOTE - OPERATION/FINDINGS
EUA - uterus with limited mobility, non-palpable adnexa  Intraop - Right fallopian tube torsed x2, 2 ~5cm simple appearing paratubal cysts, dilating right fallopian tube. Right fallopian tube with splayed fimbria, non-hemostatic following cyst removal. Decision made to proceed with right salpingectomy as tube appeared damaged.   Uterus with filmy midline likely endometriotic lesions posteriorly, biopsy taken. Otherwise grossly normal appearing left fallopian tube and ovary, and grossly normal liver edge, appendix

## 2020-04-26 NOTE — H&P ADULT - REASON FOR ADMISSION
Impression: Anatomical narrow angle, bilateral: H40.033. OU. Plan: Pt ed re: condition. Refer for consult w/Dr. Eliz Kennedy, possible YAG candidate OU. Return for diabetic evaluation following consult.
possible ovarian torsion

## 2020-04-26 NOTE — ED PROVIDER NOTE - ATTENDING CONTRIBUTION TO CARE
Attending MD Roro Spaulding:  I personally have seen and examined this patient.  Resident note reviewed and agree on plan of care and except where noted.  See HPI, PE, and MDM for details.

## 2020-04-26 NOTE — ASU DISCHARGE PLAN (ADULT/PEDIATRIC) - CARE PROVIDER_API CALL
Meg Chaparro)  Obstetrics and Gynecology  [address] [place] [address], [address]  [place] [address]  Phone: [phone]  Fax: [phone]  Follow Up Time:

## 2020-04-26 NOTE — ED ADULT NURSE NOTE - OBJECTIVE STATEMENT
24 year old female presenting to ED from home c/o abdominal pain. PMH includes ovarian cyst. Pt A+Ox3 reports RLQ abdominal pain beginning this morning, and states the worst she has ever experienced. Pt reports 8/10 constant pain, and lightheadedness, however upon arrival to ED pain subsided to 2/10 and denies headache, dizziness, or lightheadedness. Pt reports noticing slight blood in her underwear, however reports LMP ended 2 days ago. Pt denies chest pain, SOB, N/V, burning, frequency, or blood in urine, changes in bowel patterns.

## 2020-04-26 NOTE — ED PROVIDER NOTE - PHYSICAL EXAMINATION
Attending Roro Spaulding: Gen: NAD, heent: atrauamtic, eomi, perrla, mmm, op pink, uvula midline, neck; nttp, no nuchal rigidity, chest: nttp, no crepitus, cv: rrr, no murmurs, lungs: ctab, abd: soft, ttp RLQ no peritoneal signs, +BS, no guarding, ext: wwp, neg homans, skin: no rash, neuro: awake and alert, following commands, speech clear, sensation and strength intact, no focal deficits

## 2020-04-26 NOTE — ASU DISCHARGE PLAN (ADULT/PEDIATRIC) - ASU DC SPECIAL INSTRUCTIONSFT
You may take Tylenol every 6hrs or Motrin 600mg every 6hrs as needed for mild to moderate pain. You may take oxycodone 5mg every 6hrs for severe pain.  You may resume regular diet as tolerated.    Resume normal activity as tolerated.    No heavy lifting, driving, or strenuous activity for 2 weeks. Call your doctor with any signs and symptoms of infection such as fever, chills, nausea or vomiting.  Call your doctor with redness or swelling at the incision site, fluid leakage or wound separation.  Call your doctor if you're unable to tolerate food or have difficulty urinating.  Call your doctor if you have pain that is not relieved by your prescribed medications.  Notify your doctor with any other concerns.    Follow up with Dr. Chaparro for your postoperative visit

## 2020-04-26 NOTE — CONSULT NOTE ADULT - SUBJECTIVE AND OBJECTIVE BOX
R4 GYN Consult Note    24y G_P_, LMP      presents with       OBHx: G0  GynHx: ovarian cysts, no known fibroids, no h/o STDs  PMSH: ovarian cysts  All: NKDA, +Aanaphylaxis to shellfish  Meds: OCPs    Vital Signs Last 24 Hrs  T(C): 36.7 (26 Apr 2020 08:40), Max: 36.7 (26 Apr 2020 08:40)  T(F): 98 (26 Apr 2020 08:40), Max: 98 (26 Apr 2020 08:40)  HR: 70 (26 Apr 2020 08:40) (68 - 70)  BP: 98/62 (26 Apr 2020 08:40) (98/62 - 101/53)  RR: 16 (26 Apr 2020 08:40) (16 - 16)  SpO2: 100% (26 Apr 2020 08:40) (100% - 100%)    Focused PE, limiting device use 2/2 COVID  Gen: Comfortable, NAD  Abd: Soft, mild tenderness in RLQ, no rebound or guarding  Speculum exam previously performed by ED  Bimanual: cervix closed, normal sized uterus nontender, no CMT, +R adnexal fullness/tenderness. Pt unable to tolerate complete eval 2/2 discomfort  Ext: No edema or calf tenderness bilaterally      LABS:    bHCG <2                        14.3   11.32 )-----------( 216      ( 26 Apr 2020 09:08 )             42.7     04-26    138  |  102  |  15  ----------------------------<  98  3.7   |  23  |  0.82    Ca    9.4      26 Apr 2020 09:08    TPro  7.8  /  Alb  4.4  /  TBili  0.4  /  DBili  x   /  AST  17  /  ALT  24  /  AlkPhos  51  04-26    PT/INR - ( 26 Apr 2020 09:08 )   PT: 12.8 sec;   INR: 1.11 ratio         PTT - ( 26 Apr 2020 09:08 )  PTT:25.7 sec

## 2020-04-26 NOTE — H&P ADULT - HISTORY OF PRESENT ILLNESS
25yo G0 LMP 4/20/20 with h/o ovarian cysts, presenting with c/o severe abdominal pain in right lower quadrant since this morning. Per patient, she has been experiencing intermittent pain since December 2019, deemed attributable to two large cysts noted on sono. She has been taking oral contraceptives to help with cyst development. Today, she went grocery shopping and noted worsening pain with ambulation, that became intolerable by the time she got home. Patient did not take anything for pain and came to ED, concerned for possible rupture. She also reported nausea and felt lightheaded at the time of the pain. In ED, pain had improved, and patient did not require any analgesics.   Pain returned during transvaginal ultrasound in the same fashion - severe sharp pain radiating to mid-abdomen. At time of my evaluation after the ultrasound, the pain had improved again. Patient denies chest pain, SOB, lightheadedness or dizziness. Noted light spotting this morning which she attributes to the end of her period. No heavy bleeding.    OBHx: G0  GynHx: ovarian cysts, no known fibroids, no h/o STDs  PMSH: ovarian cysts  All: NKDA, +Aanaphylaxis to shellfish  Meds: OCPs    Vital Signs Last 24 Hrs  T(C): 36.7 (26 Apr 2020 08:40), Max: 36.7 (26 Apr 2020 08:40)  T(F): 98 (26 Apr 2020 08:40), Max: 98 (26 Apr 2020 08:40)  HR: 70 (26 Apr 2020 08:40) (68 - 70)  BP: 98/62 (26 Apr 2020 08:40) (98/62 - 101/53)  RR: 16 (26 Apr 2020 08:40) (16 - 16)  SpO2: 100% (26 Apr 2020 08:40) (100% - 100%)    Focused PE, limiting device use 2/2 COVID  Gen: Comfortable, NAD  Abd: Soft, mild tenderness in RLQ, no rebound or guarding  Speculum exam previously performed by ED  Bimanual: cervix closed, normal sized uterus nontender, no CMT, +R adnexal fullness/tenderness. Pt unable to tolerate complete eval 2/2 discomfort  Ext: No edema or calf tenderness bilaterally      LABS:    bHCG <2                        14.3   11.32 )-----------( 216      ( 26 Apr 2020 09:08 )             42.7     04-26    138  |  102  |  15  ----------------------------<  98  3.7   |  23  |  0.82    Ca    9.4      26 Apr 2020 09:08    TPro  7.8  /  Alb  4.4  /  TBili  0.4  /  DBili  x   /  AST  17  /  ALT  24  /  AlkPhos  51  04-26    PT/INR - ( 26 Apr 2020 09:08 )   PT: 12.8 sec;   INR: 1.11 ratio         PTT - ( 26 Apr 2020 09:08 )  PTT:25.7 sec

## 2020-04-29 PROBLEM — Z78.9 OTHER SPECIFIED HEALTH STATUS: Chronic | Status: ACTIVE | Noted: 2020-04-26

## 2020-05-05 ENCOUNTER — APPOINTMENT (OUTPATIENT)
Dept: INTERNAL MEDICINE | Facility: CLINIC | Age: 25
End: 2020-05-05
Payer: COMMERCIAL

## 2020-05-05 VITALS — HEIGHT: 66 IN | WEIGHT: 117 LBS | BODY MASS INDEX: 18.8 KG/M2 | TEMPERATURE: 97.8 F

## 2020-05-05 DIAGNOSIS — R10.9 UNSPECIFIED ABDOMINAL PAIN: ICD-10-CM

## 2020-05-05 PROCEDURE — 99213 OFFICE O/P EST LOW 20 MIN: CPT | Mod: 95

## 2020-05-05 NOTE — HISTORY OF PRESENT ILLNESS
[FreeTextEntry1] : This is a 25-year-old female for followup after salpingectomy. In addition she had any ovarian cyst removed [de-identified] : Patient was initially seen by me in February with abdominal pain. Patient had a sonogram which showed any ovarian cyst. She was seen by OB/GYN and told that the cyst was benign and no further intervention was instituted.\par \par Patient went on to have further pain. The pain became intense and it was exacerbated by walking.\par \par Ultimately a repeat internal sonogram was done which demonstrated that her fallopian tube was wrapped around her cyst. She was sent to the OR

## 2020-05-05 NOTE — ASSESSMENT
[FreeTextEntry1] : This is a 25-year-old female who had a ovarian cyst and fallopian tube removed for pain secondary to most likely ischemia.\par \par I did review the pathology which was entirely benign.\par \par Her postoperative recovery has been excellent she has minimal pain and no other complaints. She will followup with OB/GYN

## 2020-11-03 ENCOUNTER — RESULT REVIEW (OUTPATIENT)
Age: 25
End: 2020-11-03

## 2021-03-01 NOTE — BRIEF OPERATIVE NOTE - NSICDXBRIEFPROCEDURE_GEN_ALL_CORE_FT
PROCEDURES:  Right salpingectomy 26-Apr-2020 15:58:48  Gloria Wright  Laparoscopy, diagnostic, female 26-Apr-2020 15:58:29  Gloria Wright Valtrex Pregnancy And Lactation Text: this medication is Pregnancy Category B and is considered safe during pregnancy. This medication is not directly found in breast milk but it's metabolite acyclovir is present.

## 2021-06-18 ENCOUNTER — RESULT REVIEW (OUTPATIENT)
Age: 26
End: 2021-06-18

## 2021-08-20 ENCOUNTER — NON-APPOINTMENT (OUTPATIENT)
Age: 26
End: 2021-08-20

## 2021-08-23 ENCOUNTER — NON-APPOINTMENT (OUTPATIENT)
Age: 26
End: 2021-08-23

## 2021-08-23 ENCOUNTER — APPOINTMENT (OUTPATIENT)
Dept: ORTHOPEDIC SURGERY | Facility: CLINIC | Age: 26
End: 2021-08-23
Payer: COMMERCIAL

## 2021-08-23 PROCEDURE — 99203 OFFICE O/P NEW LOW 30 MIN: CPT | Mod: 25

## 2021-08-23 PROCEDURE — 73110 X-RAY EXAM OF WRIST: CPT | Mod: RT

## 2021-08-23 PROCEDURE — 20612 ASPIRATE/INJ GANGLION CYST: CPT | Mod: RT

## 2021-08-23 NOTE — END OF VISIT
[FreeTextEntry3] : All medical record entries made by the Scribe were at my,  Dr. Armando Flores MD., direction and personally dictated by me on 08/23/2021. I have personally reviewed the chart and agree that the record accurately reflects my personal performance of the history, physical exam, assessment and plan.

## 2021-08-23 NOTE — ADDENDUM
[FreeTextEntry1] : I, Susan Mauro wrote this note acting as a scribe for Dr. Armando Flores on Aug 23, 2021.

## 2021-08-23 NOTE — HISTORY OF PRESENT ILLNESS
[FreeTextEntry1] : PANCHO GOETZ is a 26 year female who presents for initial evaluation of right wrist pain secondary to a cyst x 3 months. She denies injury. She states the mass is painful to the touch. It does not fluctuate in size. She does not note limitations of movement about the wrist.

## 2021-08-23 NOTE — PHYSICAL EXAM
[de-identified] : Patient is WDWN, alert, and in no acute distress. Breathing is unlabored. She is grossly oriented to person, place, and time.\par \par Right Wrist:\par Mass present dorsally\par Mass does not fluctuate in size\par Tenderness to palpation\par FROM\par Sensation normal to fingertips\par FROM of the digits. [de-identified] : AP, lateral and oblique views of the right wrist were obtained today and revealed no abnormalities. No acute fracture. No dislocation. Cartilage spaces are maintained.

## 2021-08-23 NOTE — DISCUSSION/SUMMARY
[FreeTextEntry1] : The underlying pathophysiology was reviewed with the patient. XR films were reviewed with the patient. Discussed at length the nature of the patient’s condition. The right wrist symptoms appear secondary to dorsal ganglion cyst\par \par At this point, I recommend an aspiration. Under sterile precautions, .1 cc of clear gel fluid was aspirated from the right wrist. The patient was informed that there is a probability that the cyst will refill and may warrant another aspiration. She may return to this office if this occurs. \par \par Patient can continue activities as tolerated. All questions answered, understanding verbalized. Patient in agreement with plan of care. Follow up as needed.

## 2021-10-01 NOTE — ED ADULT NURSE REASSESSMENT NOTE - NS ED NURSE REASSESS COMMENT FT1
Pt admitted to surgery for ovarian torsion. COVID test collected by MD and sent to lab. Pt aware of plan of care, type and screen resulted, consent signed and in paper chart, pending transport to OR. Yes

## 2023-01-12 ENCOUNTER — APPOINTMENT (OUTPATIENT)
Dept: ORTHOPEDIC SURGERY | Facility: CLINIC | Age: 28
End: 2023-01-12
Payer: COMMERCIAL

## 2023-01-12 PROCEDURE — 99213 OFFICE O/P EST LOW 20 MIN: CPT | Mod: 25

## 2023-01-12 PROCEDURE — 20612 ASPIRATE/INJ GANGLION CYST: CPT

## 2023-01-12 NOTE — DISCUSSION/SUMMARY
[FreeTextEntry1] : The underlying pathophysiology was reviewed with the patient. XR films were reviewed with the patient. Discussed at length the nature of the patient’s condition. The right wrist symptoms appear secondary to dorsal ganglion cyst.\par \par At this time, given the recurrence of her symptoms, we discussed further treatment options consisting of a repeat aspiration versus surgical excision. She deferred surgical excision and has opted for a repeat aspiration.\par \par Under sterile precautions, .2 cc of clear gel fluid was aspirated from the right wrist dorsally. The patient was informed that there is a probability that the cyst will refill and may warrant another aspiration. She may return to this office if this occurs. \par \par All questions answered, understanding verbalized. Patient in agreement with plan of care. Follow up as needed.

## 2023-01-12 NOTE — HISTORY OF PRESENT ILLNESS
[Right] : right hand dominant [FreeTextEntry1] : Pt is a 28 y/o female with right wrist pain secondary to a dorsal ganglion cyst. She was initially seen in the office on 8/23/21 at which time the cyst was aspirated. She returns for reassessment on 1/12/23. She notes as of about 2 or 3 months ago the cyst returned. She has increased pain with weight-bearing through the wrist as well as when working out, lifting weights.

## 2023-01-12 NOTE — ADDENDUM
[FreeTextEntry1] : I, Susan Mauro wrote this note acting as a scribe for Dr. Armando Flores on Jan 12, 2023.

## 2023-01-12 NOTE — END OF VISIT
[FreeTextEntry3] : All medical record entries made by the Scribe were at my,  Dr. Armando Flores MD., direction and personally dictated by me on 01/12/2023. I have personally reviewed the chart and agree that the record accurately reflects my personal performance of the history, physical exam, assessment and plan.

## 2023-01-12 NOTE — PHYSICAL EXAM
[de-identified] : Patient is WDWN, alert, and in no acute distress. Breathing is unlabored. She is grossly oriented to person, place, and time.\par \par Right Wrist:\par Mass present dorsally\par Mass does not fluctuate in size\par Soft and freely mobile\par Tender on palpation\par FROM to the wrist with pain.\par Sensation normal to fingertips\par FROM of the digits.  [de-identified] : no new imaging today

## 2023-03-08 ENCOUNTER — NON-APPOINTMENT (OUTPATIENT)
Age: 28
End: 2023-03-08

## 2023-03-08 ENCOUNTER — APPOINTMENT (OUTPATIENT)
Dept: INTERNAL MEDICINE | Facility: CLINIC | Age: 28
End: 2023-03-08
Payer: COMMERCIAL

## 2023-03-08 ENCOUNTER — LABORATORY RESULT (OUTPATIENT)
Age: 28
End: 2023-03-08

## 2023-03-08 VITALS
SYSTOLIC BLOOD PRESSURE: 118 MMHG | WEIGHT: 125 LBS | HEIGHT: 66 IN | BODY MASS INDEX: 20.09 KG/M2 | DIASTOLIC BLOOD PRESSURE: 72 MMHG

## 2023-03-08 DIAGNOSIS — Z00.00 ENCOUNTER FOR GENERAL ADULT MEDICAL EXAMINATION W/OUT ABNORMAL FINDINGS: ICD-10-CM

## 2023-03-08 DIAGNOSIS — M41.9 SCOLIOSIS, UNSPECIFIED: ICD-10-CM

## 2023-03-08 DIAGNOSIS — R94.31 ABNORMAL ELECTROCARDIOGRAM [ECG] [EKG]: ICD-10-CM

## 2023-03-08 PROCEDURE — 36415 COLL VENOUS BLD VENIPUNCTURE: CPT

## 2023-03-08 PROCEDURE — 99395 PREV VISIT EST AGE 18-39: CPT | Mod: 25

## 2023-03-08 PROCEDURE — 93000 ELECTROCARDIOGRAM COMPLETE: CPT

## 2023-03-08 RX ORDER — NORETHINDRONE ACETATE AND ETHINYL ESTRADIOL AND FERROUS FUMARATE 1MG-20(24)
KIT ORAL DAILY
Refills: 0 | Status: DISCONTINUED | COMMUNITY
End: 2023-03-08

## 2023-03-08 NOTE — HEALTH RISK ASSESSMENT
[Very Good] : ~his/her~  mood as very good [Yes] : Yes [No falls in past year] : Patient reported no falls in the past year [0] : 2) Feeling down, depressed, or hopeless: Not at all (0) [PHQ-2 Negative - No further assessment needed] : PHQ-2 Negative - No further assessment needed [Hepatitis C test offered] : Hepatitis C test offered [With Significant Other] : lives with significant other [Employed] : employed [Graduate School] : graduate school [Significant Other] : lives with significant other [Sexually Active] : sexually active [Feels Safe at Home] : Feels safe at home [Fully functional (bathing, dressing, toileting, transferring, walking, feeding)] : Fully functional (bathing, dressing, toileting, transferring, walking, feeding) [Fully functional (using the telephone, shopping, preparing meals, housekeeping, doing laundry, using] : Fully functional and needs no help or supervision to perform IADLs (using the telephone, shopping, preparing meals, housekeeping, doing laundry, using transportation, managing medications and managing finances) [Smoke Detector] : smoke detector [Carbon Monoxide Detector] : carbon monoxide detector [Seat Belt] :  uses seat belt [Sunscreen] : uses sunscreen [I will adhere to the patient's wishes.] : I will adhere to the patient's wishes. [Never] : Never [Change in mental status noted] : No change in mental status noted [Reports changes in hearing] : Reports no changes in hearing [Reports changes in vision] : Reports no changes in vision [Reports changes in dental health] : Reports no changes in dental health [Guns at Home] : no guns at home [Safety elements used in home] : no safety elements used in home [Travel to Developing Areas] : does not  travel to developing areas [TB Exposure] : is not being exposed to tuberculosis [Caregiver Concerns] : does not have caregiver concerns

## 2023-03-08 NOTE — HISTORY OF PRESENT ILLNESS
[FreeTextEntry1] : This is a 27-year-old female for annual health assessment\par \par  [de-identified] : Patient's past medical history is that of a unilateral salpingectomy and oophorectomy.  She also states that she had a vascular migraine in the past and switch birth control pills with no further episodes\par \par Her only complaint is an intermittent cyst of her lower lip.  She also does have a ganglion cyst but she is not anxious to have at this point in her right wrist

## 2023-03-08 NOTE — ASSESSMENT
[FreeTextEntry1] : This is a healthy-appearing 27-year-old female whose history has been reviewed above\par \par She has had a salpingectomy and oophorectomy for torsion.  She has had no recurrence or abdominal pain\par \par She has mild scoliosis not clinically important at this point she exercises vigorously and maintains a lean diet\par \par She has most likely buccal cyst I will send her to oral surgery for evaluation\par \par She did have what she describes as a ocular migraine since switching birth control pill she has had no recurrence\par \par Her EKG shows a right access and some ST-T wave changes probably normal for her age however we will obtain an echocardiogram\par \par I did discuss this with cardiology who reviewed the EKG and agreed may be secondary to her scoliosis\par \par Unfortunately she has declined COVID vaccinations\par \par I did refer her to proctology because of rectal bleeding probably secondary to hemorrhoids\par \par

## 2023-03-10 DIAGNOSIS — D75.89 OTHER SPECIFIED DISEASES OF BLOOD AND BLOOD-FORMING ORGANS: ICD-10-CM

## 2023-03-10 LAB
25(OH)D3 SERPL-MCNC: 35.8 NG/ML
ALBUMIN SERPL ELPH-MCNC: 4.5 G/DL
ALP BLD-CCNC: 72 U/L
ALT SERPL-CCNC: 40 U/L
ANION GAP SERPL CALC-SCNC: 12 MMOL/L
APPEARANCE: CLEAR
AST SERPL-CCNC: 23 U/L
BASOPHILS # BLD AUTO: 0.07 K/UL
BASOPHILS NFR BLD AUTO: 0.8 %
BILIRUB SERPL-MCNC: 0.5 MG/DL
BILIRUBIN URINE: NEGATIVE
BLOOD URINE: NEGATIVE
BUN SERPL-MCNC: 11 MG/DL
CALCIUM SERPL-MCNC: 9.4 MG/DL
CHLORIDE SERPL-SCNC: 104 MMOL/L
CHOLEST SERPL-MCNC: 133 MG/DL
CO2 SERPL-SCNC: 24 MMOL/L
COLOR: NORMAL
CREAT SERPL-MCNC: 0.82 MG/DL
EGFR: 101 ML/MIN/1.73M2
EOSINOPHIL # BLD AUTO: 0.08 K/UL
EOSINOPHIL NFR BLD AUTO: 0.9 %
ESTIMATED AVERAGE GLUCOSE: 100 MG/DL
GLUCOSE QUALITATIVE U: NEGATIVE
GLUCOSE SERPL-MCNC: 83 MG/DL
HBA1C MFR BLD HPLC: 5.1 %
HCT VFR BLD CALC: 42.8 %
HCV AB SER QL: NONREACTIVE
HCV S/CO RATIO: 0.14 S/CO
HDLC SERPL-MCNC: 64 MG/DL
HGB BLD-MCNC: 13.8 G/DL
IMM GRANULOCYTES NFR BLD AUTO: 0.3 %
KETONES URINE: NEGATIVE
LDLC SERPL CALC-MCNC: 58 MG/DL
LEUKOCYTE ESTERASE URINE: ABNORMAL
LYMPHOCYTES # BLD AUTO: 2.91 K/UL
LYMPHOCYTES NFR BLD AUTO: 33.8 %
MAN DIFF?: NORMAL
MCHC RBC-ENTMCNC: 32.2 GM/DL
MCHC RBC-ENTMCNC: 33.3 PG
MCV RBC AUTO: 103.1 FL
MONOCYTES # BLD AUTO: 0.74 K/UL
MONOCYTES NFR BLD AUTO: 8.6 %
NEUTROPHILS # BLD AUTO: 4.78 K/UL
NEUTROPHILS NFR BLD AUTO: 55.6 %
NITRITE URINE: NEGATIVE
NONHDLC SERPL-MCNC: 69 MG/DL
PH URINE: 6.5
PLATELET # BLD AUTO: 199 K/UL
POTASSIUM SERPL-SCNC: 4.1 MMOL/L
PROT SERPL-MCNC: 7.2 G/DL
PROTEIN URINE: NEGATIVE
RBC # BLD: 4.15 M/UL
RBC # FLD: 12.9 %
SODIUM SERPL-SCNC: 140 MMOL/L
SPECIFIC GRAVITY URINE: 1.02
T3RU NFR SERPL: 0.9 TBI
T4 SERPL-MCNC: 5 UG/DL
TRIGL SERPL-MCNC: 54 MG/DL
TSH SERPL-ACNC: 1.9 UIU/ML
URATE SERPL-MCNC: 4.7 MG/DL
UROBILINOGEN URINE: NORMAL
VIT B12 SERPL-MCNC: 423 PG/ML
WBC # FLD AUTO: 8.61 K/UL

## 2023-03-13 ENCOUNTER — TRANSCRIPTION ENCOUNTER (OUTPATIENT)
Age: 28
End: 2023-03-13

## 2023-05-30 ENCOUNTER — APPOINTMENT (OUTPATIENT)
Dept: ORTHOPEDIC SURGERY | Facility: CLINIC | Age: 28
End: 2023-05-30
Payer: COMMERCIAL

## 2023-05-30 VITALS — BODY MASS INDEX: 20.25 KG/M2 | HEIGHT: 66 IN | WEIGHT: 126 LBS

## 2023-05-30 DIAGNOSIS — M67.431 GANGLION, RIGHT WRIST: ICD-10-CM

## 2023-05-30 PROCEDURE — 99213 OFFICE O/P EST LOW 20 MIN: CPT | Mod: 25

## 2023-05-30 PROCEDURE — 20612 ASPIRATE/INJ GANGLION CYST: CPT

## 2023-05-30 NOTE — END OF VISIT
[FreeTextEntry3] : All medical record entries made by the Scribe were at my,  Dr. Armando Flores MD., direction and personally dictated by me on 05/30/2023. I have personally reviewed the chart and agree that the record accurately reflects my personal performance of the history, physical exam, assessment and plan.

## 2023-05-30 NOTE — PHYSICAL EXAM
[de-identified] : Patient is WDWN, alert, and in no acute distress. Breathing is unlabored. She is grossly oriented to person, place, and time.\par \par Right Wrist:\par Mass present dorsally\par Mass does not fluctuate in size\par Soft and freely mobile\par Tender on palpation\par FROM to the wrist with pain.\par Sensation normal to fingertips\par FROM of the digits.  [de-identified] : no new imaging today

## 2023-05-30 NOTE — ADDENDUM
[FreeTextEntry1] : I, Susan Mauro wrote this note acting as a scribe for Dr. Armando Flores on May 30, 2023.

## 2023-05-30 NOTE — DISCUSSION/SUMMARY
[FreeTextEntry1] : The underlying pathophysiology was reviewed with the patient. Discussed at length the nature of the patient’s condition. The right wrist symptoms appear secondary to dorsal ganglion cyst.\par \par At this time, given the recurrence of her symptoms, we discussed further treatment options consisting of a repeat aspiration versus surgical excision. She deferred surgical excision and has opted for a repeat aspiration.\par \par Under sterile precautions, .2 cc of clear gel fluid was aspirated from the dorsum of her right wrist The patient was informed that there is a probability that the cyst will refill and may warrant another aspiration. She may return to this office if this occurs. \par \par All questions answered, understanding verbalized. Patient in agreement with plan of care. Follow up as needed.

## 2023-05-30 NOTE — HISTORY OF PRESENT ILLNESS
[FreeTextEntry1] : Pt is a RHD 29 y/o female with right wrist pain secondary to a dorsal ganglion cyst. She has been seen twice in the past, most recently on 1/12/23, at which time the ganglion cyst was aspirated. She returns on 5/30/23 with a recurrence of the dorsal wrist cyst. She notes as of about 1 month ago, her pain returned and soon after the cyst increased in size and became more notable. She would like the cyst aspirated once again. She states she understands that eventually she will require surgical excision however this is not a good time for her as she will soon be getting .

## 2024-01-18 ENCOUNTER — APPOINTMENT (OUTPATIENT)
Dept: ANTEPARTUM | Facility: CLINIC | Age: 29
End: 2024-01-18
Payer: COMMERCIAL

## 2024-01-18 ENCOUNTER — ASOB RESULT (OUTPATIENT)
Age: 29
End: 2024-01-18

## 2024-01-18 PROCEDURE — 76801 OB US < 14 WKS SINGLE FETUS: CPT

## 2024-01-18 PROCEDURE — 76813 OB US NUCHAL MEAS 1 GEST: CPT

## 2024-03-14 ENCOUNTER — APPOINTMENT (OUTPATIENT)
Dept: ANTEPARTUM | Facility: CLINIC | Age: 29
End: 2024-03-14
Payer: COMMERCIAL

## 2024-03-14 ENCOUNTER — ASOB RESULT (OUTPATIENT)
Age: 29
End: 2024-03-14

## 2024-03-14 PROCEDURE — 76805 OB US >/= 14 WKS SNGL FETUS: CPT

## 2024-06-30 ENCOUNTER — OUTPATIENT (OUTPATIENT)
Dept: INPATIENT UNIT | Facility: HOSPITAL | Age: 29
LOS: 1 days | Discharge: HOME | End: 2024-06-30
Payer: COMMERCIAL

## 2024-06-30 VITALS
RESPIRATION RATE: 16 BRPM | SYSTOLIC BLOOD PRESSURE: 121 MMHG | HEART RATE: 78 BPM | DIASTOLIC BLOOD PRESSURE: 60 MMHG | TEMPERATURE: 98 F

## 2024-06-30 VITALS — DIASTOLIC BLOOD PRESSURE: 58 MMHG | HEART RATE: 68 BPM | SYSTOLIC BLOOD PRESSURE: 125 MMHG

## 2024-06-30 DIAGNOSIS — O26.899 OTHER SPECIFIED PREGNANCY RELATED CONDITIONS, UNSPECIFIED TRIMESTER: ICD-10-CM

## 2024-06-30 LAB
APPEARANCE UR: CLEAR — SIGNIFICANT CHANGE UP
BACTERIA # UR AUTO: NEGATIVE /HPF — SIGNIFICANT CHANGE UP
BILIRUB UR-MCNC: NEGATIVE — SIGNIFICANT CHANGE UP
CAST: 1 /LPF — SIGNIFICANT CHANGE UP (ref 0–4)
COLOR SPEC: YELLOW — SIGNIFICANT CHANGE UP
DIFF PNL FLD: NEGATIVE — SIGNIFICANT CHANGE UP
GLUCOSE UR QL: NEGATIVE MG/DL — SIGNIFICANT CHANGE UP
KETONES UR-MCNC: 15 MG/DL
LEUKOCYTE ESTERASE UR-ACNC: ABNORMAL
NITRITE UR-MCNC: NEGATIVE — SIGNIFICANT CHANGE UP
PH UR: 7 — SIGNIFICANT CHANGE UP (ref 5–8)
PROT UR-MCNC: NEGATIVE MG/DL — SIGNIFICANT CHANGE UP
RBC CASTS # UR COMP ASSIST: 1 /HPF — SIGNIFICANT CHANGE UP (ref 0–4)
REVIEW: SIGNIFICANT CHANGE UP
SP GR SPEC: 1.01 — SIGNIFICANT CHANGE UP (ref 1–1.03)
SQUAMOUS # UR AUTO: 3 /HPF — SIGNIFICANT CHANGE UP (ref 0–5)
UROBILINOGEN FLD QL: 0.2 MG/DL — SIGNIFICANT CHANGE UP (ref 0.2–1)
WBC UR QL: 5 /HPF — SIGNIFICANT CHANGE UP (ref 0–5)

## 2024-06-30 PROCEDURE — 99221 1ST HOSP IP/OBS SF/LOW 40: CPT | Mod: 25

## 2024-06-30 PROCEDURE — 59025 FETAL NON-STRESS TEST: CPT | Mod: 26

## 2024-06-30 PROCEDURE — 83986 ASSAY PH BODY FLUID NOS: CPT | Mod: QW

## 2024-06-30 RX ORDER — PRENATAL VIT/IRON FUM/FOLIC AC 60 MG-1 MG
1 TABLET ORAL
Refills: 0 | DISCHARGE

## 2024-06-30 RX ORDER — DEXTROSE MONOHYDRATE AND SODIUM CHLORIDE 5; .3 G/100ML; G/100ML
500 INJECTION, SOLUTION INTRAVENOUS ONCE
Refills: 0 | Status: COMPLETED | OUTPATIENT
Start: 2024-06-30 | End: 2024-06-30

## 2024-06-30 RX ORDER — IBUPROFEN 200 MG
3 TABLET ORAL
Qty: 0 | Refills: 0 | DISCHARGE

## 2024-06-30 RX ORDER — DOCUSATE SODIUM 100 MG
1 CAPSULE ORAL
Qty: 0 | Refills: 0 | DISCHARGE

## 2024-06-30 RX ORDER — ACETAMINOPHEN 500 MG
2 TABLET ORAL
Qty: 0 | Refills: 0 | DISCHARGE

## 2024-06-30 RX ORDER — BENZOCAINE/MENTHOL 6 MG-10 MG
1 LOZENGE MUCOUS MEMBRANE ONCE
Refills: 0 | Status: COMPLETED | OUTPATIENT
Start: 2024-06-30 | End: 2024-06-30

## 2024-06-30 RX ADMIN — DEXTROSE MONOHYDRATE AND SODIUM CHLORIDE 500 MILLILITER(S): 5; .3 INJECTION, SOLUTION INTRAVENOUS at 22:56

## 2024-06-30 RX ADMIN — Medication 1 ENEMA: at 22:05

## 2024-07-02 DIAGNOSIS — R30.0 DYSURIA: ICD-10-CM

## 2024-07-02 DIAGNOSIS — R10.30 LOWER ABDOMINAL PAIN, UNSPECIFIED: ICD-10-CM

## 2024-07-02 DIAGNOSIS — Z87.42 PERSONAL HISTORY OF OTHER DISEASES OF THE FEMALE GENITAL TRACT: ICD-10-CM

## 2024-07-02 DIAGNOSIS — Z3A.35 35 WEEKS GESTATION OF PREGNANCY: ICD-10-CM

## 2024-07-02 DIAGNOSIS — O99.613 DISEASES OF THE DIGESTIVE SYSTEM COMPLICATING PREGNANCY, THIRD TRIMESTER: ICD-10-CM

## 2024-07-02 DIAGNOSIS — O26.893 OTHER SPECIFIED PREGNANCY RELATED CONDITIONS, THIRD TRIMESTER: ICD-10-CM

## 2024-07-02 DIAGNOSIS — K59.00 CONSTIPATION, UNSPECIFIED: ICD-10-CM

## 2024-07-29 ENCOUNTER — TRANSCRIPTION ENCOUNTER (OUTPATIENT)
Age: 29
End: 2024-07-29

## 2024-07-29 ENCOUNTER — INPATIENT (INPATIENT)
Facility: HOSPITAL | Age: 29
LOS: 1 days | Discharge: HOME | End: 2024-07-31
Attending: OBSTETRICS & GYNECOLOGY | Admitting: OBSTETRICS & GYNECOLOGY
Payer: COMMERCIAL

## 2024-07-29 ENCOUNTER — APPOINTMENT (OUTPATIENT)
Dept: ANTEPARTUM | Facility: CLINIC | Age: 29
End: 2024-07-29

## 2024-07-29 VITALS
HEART RATE: 65 BPM | RESPIRATION RATE: 16 BRPM | SYSTOLIC BLOOD PRESSURE: 126 MMHG | TEMPERATURE: 99 F | DIASTOLIC BLOOD PRESSURE: 60 MMHG

## 2024-07-29 DIAGNOSIS — Z90.79 ACQUIRED ABSENCE OF OTHER GENITAL ORGAN(S): Chronic | ICD-10-CM

## 2024-07-29 DIAGNOSIS — O26.899 OTHER SPECIFIED PREGNANCY RELATED CONDITIONS, UNSPECIFIED TRIMESTER: ICD-10-CM

## 2024-07-29 LAB
BASOPHILS # BLD AUTO: 0 K/UL — SIGNIFICANT CHANGE UP (ref 0–0.2)
BASOPHILS # BLD AUTO: 0.07 K/UL — SIGNIFICANT CHANGE UP (ref 0–0.2)
BASOPHILS NFR BLD AUTO: 0 % — SIGNIFICANT CHANGE UP (ref 0–2)
BASOPHILS NFR BLD AUTO: 0.4 % — SIGNIFICANT CHANGE UP (ref 0–2)
BLD GP AB SCN SERPL QL: NEGATIVE — SIGNIFICANT CHANGE UP
EOSINOPHIL # BLD AUTO: 0 K/UL — SIGNIFICANT CHANGE UP (ref 0–0.5)
EOSINOPHIL # BLD AUTO: 0.04 K/UL — SIGNIFICANT CHANGE UP (ref 0–0.5)
EOSINOPHIL NFR BLD AUTO: 0 % — SIGNIFICANT CHANGE UP (ref 0–6)
EOSINOPHIL NFR BLD AUTO: 0.2 % — SIGNIFICANT CHANGE UP (ref 0–6)
HCT VFR BLD CALC: 27.1 % — LOW (ref 34.5–45)
HCT VFR BLD CALC: 39.4 % — SIGNIFICANT CHANGE UP (ref 34.5–45)
HGB BLD-MCNC: 13.3 G/DL — SIGNIFICANT CHANGE UP (ref 11.5–15.5)
HGB BLD-MCNC: 9.1 G/DL — LOW (ref 11.5–15.5)
IANC: 11.98 K/UL — HIGH (ref 1.8–7.4)
IANC: 13.9 K/UL — HIGH (ref 1.8–7.4)
IMM GRANULOCYTES NFR BLD AUTO: 3 % — HIGH (ref 0–0.9)
LACTATE SERPL-SCNC: 2 MMOL/L — SIGNIFICANT CHANGE UP (ref 0.5–2)
LYMPHOCYTES # BLD AUTO: 12.2 % — LOW (ref 13–44)
LYMPHOCYTES # BLD AUTO: 12.7 % — LOW (ref 13–44)
LYMPHOCYTES # BLD AUTO: 2.01 K/UL — SIGNIFICANT CHANGE UP (ref 1–3.3)
LYMPHOCYTES # BLD AUTO: 2.31 K/UL — SIGNIFICANT CHANGE UP (ref 1–3.3)
MACROCYTES BLD QL: SIGNIFICANT CHANGE UP
MANUAL SMEAR VERIFICATION: SIGNIFICANT CHANGE UP
MCHC RBC-ENTMCNC: 33.6 GM/DL — SIGNIFICANT CHANGE UP (ref 32–36)
MCHC RBC-ENTMCNC: 33.8 GM/DL — SIGNIFICANT CHANGE UP (ref 32–36)
MCHC RBC-ENTMCNC: 34 PG — SIGNIFICANT CHANGE UP (ref 27–34)
MCHC RBC-ENTMCNC: 34.3 PG — HIGH (ref 27–34)
MCV RBC AUTO: 100.8 FL — HIGH (ref 80–100)
MCV RBC AUTO: 102.3 FL — HIGH (ref 80–100)
METAMYELOCYTES # FLD: 0.9 % — SIGNIFICANT CHANGE UP (ref 0–1)
MICROCYTES BLD QL: SLIGHT — SIGNIFICANT CHANGE UP
MONOCYTES # BLD AUTO: 0.86 K/UL — SIGNIFICANT CHANGE UP (ref 0–0.9)
MONOCYTES # BLD AUTO: 1.37 K/UL — HIGH (ref 0–0.9)
MONOCYTES NFR BLD AUTO: 5.2 % — SIGNIFICANT CHANGE UP (ref 2–14)
MONOCYTES NFR BLD AUTO: 7.5 % — SIGNIFICANT CHANGE UP (ref 2–14)
MYELOCYTES NFR BLD: 0.9 % — HIGH (ref 0–0)
NEUTROPHILS # BLD AUTO: 12.61 K/UL — HIGH (ref 1.8–7.4)
NEUTROPHILS # BLD AUTO: 13.9 K/UL — HIGH (ref 1.8–7.4)
NEUTROPHILS NFR BLD AUTO: 73.9 % — SIGNIFICANT CHANGE UP (ref 43–77)
NEUTROPHILS NFR BLD AUTO: 76.2 % — SIGNIFICANT CHANGE UP (ref 43–77)
NEUTS BAND # BLD: 2.6 % — SIGNIFICANT CHANGE UP (ref 0–6)
NRBC # BLD: 0 /100 WBCS — SIGNIFICANT CHANGE UP (ref 0–0)
NRBC # FLD: 0 K/UL — SIGNIFICANT CHANGE UP (ref 0–0)
PLAT MORPH BLD: NORMAL — SIGNIFICANT CHANGE UP
PLATELET # BLD AUTO: 150 K/UL — SIGNIFICANT CHANGE UP (ref 150–400)
PLATELET # BLD AUTO: 161 K/UL — SIGNIFICANT CHANGE UP (ref 150–400)
PLATELET COUNT - ESTIMATE: NORMAL — SIGNIFICANT CHANGE UP
RBC # BLD: 2.65 M/UL — LOW (ref 3.8–5.2)
RBC # BLD: 3.91 M/UL — SIGNIFICANT CHANGE UP (ref 3.8–5.2)
RBC # FLD: 12.8 % — SIGNIFICANT CHANGE UP (ref 10.3–14.5)
RBC # FLD: 13 % — SIGNIFICANT CHANGE UP (ref 10.3–14.5)
RBC BLD AUTO: NORMAL — SIGNIFICANT CHANGE UP
RH IG SCN BLD-IMP: POSITIVE — SIGNIFICANT CHANGE UP
RH IG SCN BLD-IMP: POSITIVE — SIGNIFICANT CHANGE UP
SPHEROCYTES BLD QL SMEAR: SIGNIFICANT CHANGE UP
VARIANT LYMPHS # BLD: 4.3 % — SIGNIFICANT CHANGE UP (ref 0–6)
WBC # BLD: 16.49 K/UL — HIGH (ref 3.8–10.5)
WBC # BLD: 18.23 K/UL — HIGH (ref 3.8–10.5)
WBC # FLD AUTO: 16.49 K/UL — HIGH (ref 3.8–10.5)
WBC # FLD AUTO: 18.23 K/UL — HIGH (ref 3.8–10.5)

## 2024-07-29 RX ORDER — TRISODIUM CITRATE DIHYDRATE AND CITRIC ACID MONOHYDRATE 500; 334 MG/5ML; MG/5ML
15 SOLUTION ORAL EVERY 6 HOURS
Refills: 0 | Status: DISCONTINUED | OUTPATIENT
Start: 2024-07-29 | End: 2024-07-30

## 2024-07-29 RX ORDER — CHLORHEXIDINE GLUCONATE 500 MG/1
1 CLOTH TOPICAL DAILY
Refills: 0 | Status: DISCONTINUED | OUTPATIENT
Start: 2024-07-29 | End: 2024-07-30

## 2024-07-29 RX ORDER — MAGNESIUM HYDROXIDE 400 MG/5ML
30 SUSPENSION, ORAL (FINAL DOSE FORM) ORAL
Refills: 0 | Status: DISCONTINUED | OUTPATIENT
Start: 2024-07-29 | End: 2024-07-31

## 2024-07-29 RX ORDER — SIMETHICONE 125 MG/1
80 TABLET, CHEWABLE ORAL EVERY 4 HOURS
Refills: 0 | Status: DISCONTINUED | OUTPATIENT
Start: 2024-07-29 | End: 2024-07-31

## 2024-07-29 RX ORDER — CLOSTRIDIUM TETANI TOXOID ANTIGEN (FORMALDEHYDE INACTIVATED), CORYNEBACTERIUM DIPHTHERIAE TOXOID ANTIGEN (FORMALDEHYDE INACTIVATED), BORDETELLA PERTUSSIS TOXOID ANTIGEN (GLUTARALDEHYDE INACTIVATED), BORDETELLA PERTUSSIS FILAMENTOUS HEMAGGLUTININ ANTIGEN (FORMALDEHYDE INACTIVATED), BORDETELLA PERTUSSIS PERTACTIN ANTIGEN, AND BORDETELLA PERTUSSIS FIMBRIAE 2/3 ANTIGEN 5; 2; 2.5; 5; 3; 5 [LF]/.5ML; [LF]/.5ML; UG/.5ML; UG/.5ML; UG/.5ML; UG/.5ML
0.5 INJECTION, SUSPENSION INTRAMUSCULAR ONCE
Refills: 0 | Status: DISCONTINUED | OUTPATIENT
Start: 2024-07-29 | End: 2024-07-31

## 2024-07-29 RX ORDER — KETOROLAC TROMETHAMINE 10 MG
30 TABLET ORAL ONCE
Refills: 0 | Status: DISCONTINUED | OUTPATIENT
Start: 2024-07-29 | End: 2024-07-29

## 2024-07-29 RX ORDER — LANOLIN 100 %
1 OINTMENT (GRAM) TOPICAL EVERY 6 HOURS
Refills: 0 | Status: DISCONTINUED | OUTPATIENT
Start: 2024-07-29 | End: 2024-07-31

## 2024-07-29 RX ORDER — OXYTOCIN/RINGER'S LACTATE 20/1000 ML
PLASTIC BAG, INJECTION (ML) INTRAVENOUS
Qty: 30 | Refills: 0 | Status: DISCONTINUED | OUTPATIENT
Start: 2024-07-29 | End: 2024-07-30

## 2024-07-29 RX ORDER — OXYTOCIN/RINGER'S LACTATE 20/1000 ML
333.33 PLASTIC BAG, INJECTION (ML) INTRAVENOUS
Qty: 20 | Refills: 0 | Status: DISCONTINUED | OUTPATIENT
Start: 2024-07-29 | End: 2024-07-30

## 2024-07-29 RX ORDER — OXYCODONE HYDROCHLORIDE 30 MG/1
5 TABLET ORAL ONCE
Refills: 0 | Status: DISCONTINUED | OUTPATIENT
Start: 2024-07-29 | End: 2024-07-31

## 2024-07-29 RX ORDER — CRANBERRY FRUIT EXTRACT 650 MG
1 CAPSULE ORAL DAILY
Refills: 0 | Status: DISCONTINUED | OUTPATIENT
Start: 2024-07-29 | End: 2024-07-31

## 2024-07-29 RX ORDER — BACTERIOSTATIC SODIUM CHLORIDE 0.9 %
3 VIAL (ML) INJECTION EVERY 8 HOURS
Refills: 0 | Status: DISCONTINUED | OUTPATIENT
Start: 2024-07-29 | End: 2024-07-31

## 2024-07-29 RX ORDER — DIPHENHYDRAMINE HCL 25 MG
25 CAPSULE ORAL EVERY 6 HOURS
Refills: 0 | Status: DISCONTINUED | OUTPATIENT
Start: 2024-07-29 | End: 2024-07-31

## 2024-07-29 RX ORDER — OXYTOCIN/RINGER'S LACTATE 20/1000 ML
41.67 PLASTIC BAG, INJECTION (ML) INTRAVENOUS
Qty: 20 | Refills: 0 | Status: DISCONTINUED | OUTPATIENT
Start: 2024-07-29 | End: 2024-07-30

## 2024-07-29 RX ORDER — WITCH HAZEL 500 MG/1
1 CLOTH TOPICAL EVERY 4 HOURS
Refills: 0 | Status: DISCONTINUED | OUTPATIENT
Start: 2024-07-29 | End: 2024-07-31

## 2024-07-29 RX ORDER — DIBUCAINE 1 %
1 OINTMENT (GRAM) TOPICAL EVERY 6 HOURS
Refills: 0 | Status: DISCONTINUED | OUTPATIENT
Start: 2024-07-29 | End: 2024-07-31

## 2024-07-29 RX ORDER — IBUPROFEN 200 MG
600 TABLET ORAL EVERY 6 HOURS
Refills: 0 | Status: COMPLETED | OUTPATIENT
Start: 2024-07-29 | End: 2025-06-27

## 2024-07-29 RX ORDER — DEXTROSE MONOHYDRATE, SODIUM CHLORIDE, SODIUM LACTATE, CALCIUM CHLORIDE, MAGNESIUM CHLORIDE 1.5; 538; 448; 18.4; 5.08 G/100ML; MG/100ML; MG/100ML; MG/100ML; MG/100ML
1000 SOLUTION INTRAPERITONEAL
Refills: 0 | Status: DISCONTINUED | OUTPATIENT
Start: 2024-07-29 | End: 2024-07-30

## 2024-07-29 RX ORDER — OXYCODONE HYDROCHLORIDE 30 MG/1
5 TABLET ORAL
Refills: 0 | Status: DISCONTINUED | OUTPATIENT
Start: 2024-07-29 | End: 2024-07-31

## 2024-07-29 RX ORDER — ACETAMINOPHEN 500 MG
975 TABLET ORAL
Refills: 0 | Status: DISCONTINUED | OUTPATIENT
Start: 2024-07-29 | End: 2024-07-31

## 2024-07-29 RX ORDER — HYDROCORTISONE 1 %
1 CREAM (GRAM) TOPICAL EVERY 6 HOURS
Refills: 0 | Status: DISCONTINUED | OUTPATIENT
Start: 2024-07-29 | End: 2024-07-31

## 2024-07-29 RX ORDER — ACETAMINOPHEN 500 MG
3 TABLET ORAL
Qty: 0 | Refills: 0 | DISCHARGE
Start: 2024-07-29

## 2024-07-29 RX ORDER — IBUPROFEN 200 MG
1 TABLET ORAL
Qty: 0 | Refills: 0 | DISCHARGE
Start: 2024-07-29

## 2024-07-29 RX ADMIN — Medication 2 MILLIUNIT(S)/MIN: at 13:09

## 2024-07-29 RX ADMIN — DEXTROSE MONOHYDRATE, SODIUM CHLORIDE, SODIUM LACTATE, CALCIUM CHLORIDE, MAGNESIUM CHLORIDE 125 MILLILITER(S): 1.5; 538; 448; 18.4; 5.08 SOLUTION INTRAPERITONEAL at 13:09

## 2024-07-29 RX ADMIN — DEXTROSE MONOHYDRATE, SODIUM CHLORIDE, SODIUM LACTATE, CALCIUM CHLORIDE, MAGNESIUM CHLORIDE 1000 MILLILITER(S): 1.5; 538; 448; 18.4; 5.08 SOLUTION INTRAPERITONEAL at 19:55

## 2024-07-29 RX ADMIN — Medication 30 MILLIGRAM(S): at 20:10

## 2024-07-29 RX ADMIN — Medication 30 MILLIGRAM(S): at 19:42

## 2024-07-29 NOTE — OB PROVIDER DELIVERY SUMMARY - NSLOWPPHRISK_OBGYN_A_OB
No previous uterine incision/House Pregnancy/Less than or equal to 4 previous vaginal births/No known bleeding disorder/No history of postpartum hemorrhage/No other PPH risks indicated

## 2024-07-29 NOTE — DISCHARGE NOTE OB - CARE PROVIDER_API CALL
Shelly Higgins  Obstetrics and Gynecology  [address], [address]  [place] [address]  Phone: [phone]  Fax: [phone]  Follow Up Time:

## 2024-07-29 NOTE — OB RN TRIAGE NOTE - FALL HARM RISK - FALL HARM RISK
Blood pressures are stable. Continue medications and monitor blood pressures at home. Call office if systolics are over 665 over diastolics over 90.   reviewed fasting CMP result No indicators present

## 2024-07-29 NOTE — OB PROVIDER H&P - ATTENDING COMMENTS
OB Attending Note    P0 presents in labor.  Category 1 FHR tracing.  Expectant mgmt.   Epidural as needed.  Currently patient declining.     GER Bojorquez MD

## 2024-07-29 NOTE — OB PROVIDER TRIAGE NOTE - HISTORY OF PRESENT ILLNESS
PNC with Dr Higgins   28 y/o  @ 39.6 wks gestation presents with c/o painful uterine contractions every 2-3 minutes since 0430 states her pain is 6-7/10 on pain scale denies any LOF or VB reports +FM denies any n/v/d denies any fever or chills ap care uncomplicated thus far   GBS-negative 2024

## 2024-07-29 NOTE — OB PROVIDER TRIAGE NOTE - NSOBPROVIDERNOTE_OBGYN_ALL_OB_FT
30 y/o  @ 39.6 wks gestation presents in labor :   plan of care d/w dr Castro  admit to l&d  labor @ 39.6 wks gestation for expectant management   see admission orders

## 2024-07-29 NOTE — OB PROVIDER DELIVERY SUMMARY - NSPROVIDERDELIVERYNOTE_OBGYN_ALL_OB_FT
of viable infant.  Head, shoulders and body delivered easily.  Cord clamped and cut after delayed cord clamping was performed.   Placenta delivered intact.  Vaginal exam revealed intact cervix, vaginal walls and sulci.  2nd  degree laceration identified and repaired in usual fashion with 2-0 chromic suture.  Excellent hemostasis.

## 2024-07-29 NOTE — DISCHARGE NOTE OB - MATERIALS PROVIDED
[facility] Patten Screening Program/  Immunization Record/Breastfeeding Log/Breastfeeding Mother’s Support Group Information/Guide to Postpartum Care/[facility] Hearing Screen Program/Back To Sleep Handout/Shaken Baby Prevention Handout/Breastfeeding Guide and Packet/Birth Certificate Instructions/Discharge Medication Information for Patients and Families Pocket Guide/Tdap Vaccination (VIS Pub Date: 2012)

## 2024-07-29 NOTE — DISCHARGE NOTE OB - HOSPITAL COURSE
Presented in labor.  Uncomplicated .  routine pp care.  Presented in labor.  Uncomplicated .      PP failed orthostatics  H/H stable, TTE normal

## 2024-07-29 NOTE — DISCHARGE NOTE OB - PATIENT PORTAL LINK FT
You can access the FollowMyHealth Patient Portal offered by [facility] by registering at the following website: [email or web address]. By joining [employer]’s FollowMyHealth portal, you will also be able to view your health information using other applications (apps) compatible with our system.

## 2024-07-29 NOTE — OB PROVIDER H&P - NSICDXNOPASTMEDICALHX_GEN_ALL_CORE
Called patient gave message he will call back to schedule an appointment    <-- Click to add NO pertinent Past Medical History

## 2024-07-29 NOTE — OB PROVIDER TRIAGE NOTE - NS_FHRACCEL_OBGYN_ALL_OB
Gris Carreno is a 12year old male. HPI:     HPI     NP/ 12 yr old here for a complete exam and evaluation of a cyst RLL x 4-5 months. Mom tried using warm compresses 3-4 times a day but no improvement.    Pt was full term; normal development with straig (Counting fingers)       Left Right     Full Full          Extraocular Movement       Right Left     Full, Ortho Full, Ortho            Additional Tests     Color       Right Left    Aida 5/5 5/5          Stereo     Fly: +    Animals: 3/3    Circles: 8 Present (15 x15 bpm)

## 2024-07-29 NOTE — OB RN TRIAGE NOTE - NSICDXPASTMEDICALHX_GEN_ALL_CORE_FT
PAST MEDICAL HISTORY:  No pertinent past medical history Mohs Histo Method Verbiage: Each section was then chromacoded and processed in the Mohs lab using the Mohs protocol and submitted for frozen section.

## 2024-07-29 NOTE — OB RN DELIVERY SUMMARY - NSSELHIDDEN_OBGYN_ALL_OB_FT
[NS_DeliveryAttending1_OBGYN_ALL_OB_FT:[id number]=],[NS_DeliveryRN_OBGYN_ALL_OB_FT:[id number]=]

## 2024-07-29 NOTE — OB PROVIDER H&P - NSHPPHYSICALEXAM_GEN_ALL_CORE
abdomen: soft, nt on palp  SVE: 3.5/70/-3  NST reactive   FH baseline 140 FH variability mod +FH accels no FH decels  toco: every 2-3 minutes   sono images saved in ASOB   TAS vtx anterior placenta MVP: 2.14 FH: 126 bpm   EFW: 3200 grams   T(C): 36.7 (07-29-24 @ 08:14), Max: 37 (07-29-24 @ 07:49)  HR: 65 (07-29-24 @ 07:58) (65 - 65)  BP: 126/60 (07-29-24 @ 07:58) (126/60 - 126/60)  RR: 16 (07-29-24 @ 07:49) (16 - 16)  SpO2: --

## 2024-07-29 NOTE — OB PROVIDER H&P - ASSESSMENT
30 y/o  @ 39.6 wks gestation presents in labor :   plan of care d/w dr Castro/ dr Anderson  admit to l&d  labor @ 39.6 wks gestation for expectant management   see admission orders

## 2024-07-29 NOTE — OB PROVIDER LABOR PROGRESS NOTE - ASSESSMENT
Plan:  29y P0 in labor  - making cervical change  - marycarmen regularly  - requesting epidural, anesthesia notified  - fetal status reassuring     D/w Dr. Maryellen Brush-Drew PGY4

## 2024-07-29 NOTE — OB PROVIDER TRIAGE NOTE - NSHPPHYSICALEXAM_GEN_ALL_CORE
11/24/2020    Chauncey Burroughs  [address]  [facility] [address]      Dear Chauncey Burroughs,    Your procedure is scheduled with Dr. Wero Clement on December 24, 2020 at 7:30 am at:    [facility]  [address] [place]   [address]  [phone]  Please enter the main entrance and take the elevators to the 3rd floor.  Check in at Same Day Surgery desk.    Please register at [facility] on December 24, 2020 by 5:30 am.    You can expect to be contacted 1 to 3 days prior to the surgery to confirm arrival and surgery time. These times may change due to various OR schedule needs. We will call you ASAP if this happens.    Pre-Procedure / Pre-Surgery COVID-19 Testing:  We have you scheduled for your COVID-19 Testing Visit on:  December 21, 2020 at 3:30 pm  for our ACL Lab site located at [place]  ([address] [address]).    If you need to reschedule this appointment, please call our office ASAP for assistance.    Please remember the following instructions for after your test and before surgery:  Once you leave the testing area, immediately go home and remain home until the day of your procedure to reduce your risk of any new exposure.  It is also recommended that members of your household limit their outside contact until your procedure.      Failure to self-isolate during this timeframe increases your risk of new COVID-19 exposure and might result in cancellation of your procedure.  If you are in need of a work excuse, please contact our office ASAP for assistance.      The following appointment(s) have been scheduled for you:       · Post-op with Dr. Clement at the [facility], [address] ([address] [place] [address]) on January 6, 2021 at 2:30 pm .    To better prepare for your surgery, please follow these instructions:    Please remember to contact your primary care physician's office and schedule a preop  appointment. This appointment must be within 30 days of your surgery date.  We recommend completing this 2 weeks before your surgery date.  I will forward orders to your physician's office to let them know that you will be calling to schedule and for the testing required to clear you for the anesthesia.    7 days prior to your appointment DO NOT TAKE ASPIRIN OR ASPIRIN CONTAINING PRODUCTS.  This includes products such as [employer], Pepto Bismol. Motrin, Ibuprofen and Advil should also be avoided. (Only Tylenol (Acetaminophen) is aspirin free). If you take Coumadin (Warfarin) or other blood thinners such as Plavix, Eliquis or Xarelto, contact your primary care physician.    If you are taking Phentermine or any weight loss medications you MUST discontinue taking the medication 10 days prior to your surgery.    Do not eat or drink after midnight the night before your surgery.    Please plan on spending at least two nights in the hospital after your surgery.      Our PreAuthorization Team will be contacting your insurance company to ensure that they have all of the information they need from us.  We will let you know if we encounter any issues or have any concerns in advance of your scheduled surgery.  If you have any questions regarding your surgery authorization, please check with your insurance company or call our [employer] Inquiry Line at [phone]. (Please see attached for more information.)    If you have any work related and/or disability forms that need to be completed, please contact the Forms Completion Department at [phone]. Forms can be dropped off at any of our [facility] locations. Please be advised that it can take 7 to 10 business days to complete these requests.    If you have questions regarding the procedure, medications, rehab, etc., please contact the nursing staff at [facility] central scheduling line at [phone].    If you have any scheduling questions or need to  reschedule, please contact me at the telephone number and extension listed below.       Thank you,        Radha  [phone]  Surgery Scheduler & Coordinator for [facility]          \"Help us grow our quality of service. We want to improve - and you can help us. You may receive a survey in the mail. This is your opportunity to tell us what we did well, and where we could use some improvement. We value your input.\"       abdomen: soft, nt on palp  SVE: 3.5/70/-3  NST reactive   FH baseline 140 FH variability mod +FH accels no FH decels  toco: every 2-3 minutes   sono images saved in ASOB   TAS vtx anterior placenta MVP: 2.14 FH: 126 bpm   EFW: 3200 grams   T(C): 36.7 (07-29-24 @ 08:14), Max: 37 (07-29-24 @ 07:49)  HR: 65 (07-29-24 @ 07:58) (65 - 65)  BP: 126/60 (07-29-24 @ 07:58) (126/60 - 126/60)  RR: 16 (07-29-24 @ 07:49) (16 - 16)  SpO2: --

## 2024-07-29 NOTE — DISCHARGE NOTE OB - NSDCQMCOGNITION_NEU_ALL_CORE
Hospitalist Progress Note               Daily Progress Note: 1/22/2021      Subjective: The patient is seen for follow up. Surprisingly, her COVID-19 test came back positive last night. Her oxygen requirements have increased through the night and she is currently on 15 L high flow oxygen. Repeat troponin unchanged at 0.37.  proBNP 3000    Patient has had no fever.   She has had no significant cough other than some occasional throat clearing    Problem List:  Problem List as of 1/22/2021 Date Reviewed: 10/21/2020          Codes Class Noted - Resolved    Biventricular ICD (implantable cardioverter-defibrillator) in place ICD-10-CM: Z95.810  ICD-9-CM: V45.02  1/26/2018 - Present        Hypoxia ICD-10-CM: R09.02  ICD-9-CM: 799.02  1/21/2021 - Present        Elevated troponin ICD-10-CM: R77.8  ICD-9-CM: 790.6  1/21/2021 - Present        Atrial fibrillation ([facility] ICD-10-CM: I48.91  ICD-9-CM: 427.31  11/16/2020 - Present        Acute on chronic congestive heart failure ([facility] ICD-10-CM: I50.9  ICD-9-CM: 428.0  11/16/2020 - Present        Hypomagnesemia ICD-10-CM: E83.42  ICD-9-CM: 275.2  9/28/2020 - Present        Hyperkalemia ICD-10-CM: E87.5  ICD-9-CM: 276.7  9/26/2020 - Present        CHF (congestive heart failure) ([facility] ICD-10-CM: I50.9  ICD-9-CM: 428.0  9/25/2020 - Present        CHF exacerbation ([facility] ICD-10-CM: I50.9  ICD-9-CM: 428.0  9/22/2020 - Present        Severe obesity ([facility] ICD-10-CM: E66.01  ICD-9-CM: 278.01  9/2/2020 - Present        Dysarthria ICD-10-CM: R47.1  ICD-9-CM: 784.51  8/11/2020 - Present        TIA (transient ischemic attack) ICD-10-CM: G45.9  ICD-9-CM: 435.9  8/10/2020 - Present        Cardiomyopathy ([facility] ICD-10-CM: I42.9  ICD-9-CM: 425.4  5/23/2016 - Present        Skin cancer ICD-10-CM: C44.90  ICD-9-CM: 173.90  Unknown - Present        Acid indigestion ICD-10-CM: K30  ICD-9-CM: 536.8  Unknown - Present        Asthma ICD-10-CM: 713  ICD-9-CM: 149  Unknown - Present        Back pain ICD-10-CM: M54.9  ICD-9-CM: 724.5  Unknown - Present        Wears dentures ICD-10-CM: Z97.2  ICD-9-CM: V45.84  Unknown - Present        Constipation ICD-10-CM: 564.0  ICD-9-CM: 564.0  Unknown - Present        Diabetes mellitus ICD-10-CM: 250  ICD-9-CM: 250  Unknown - Present        Diarrhea ICD-10-CM: R19.7  ICD-9-CM: 787.91  Unknown - Present        Frequent episodic tension-type headache ICD-10-CM: V26.680  ICD-9-CM: 339.11  Unknown - Present        Hemorrhoids ICD-10-CM: 746  ICD-9-CM: 468  Unknown - Present        Hernia of unspecified site of abdominal cavity without mention of obstruction or gangrene ICD-10-CM: K46.9  ICD-9-CM: 553.9  Unknown - Present        HTN (hypertension) ICD-10-CM: I10  ICD-9-CM: 401.9  Unknown - Present        Muscle pain ICD-10-CM: M79.10  ICD-9-CM: 729.1  Unknown - Present        SOB (shortness of breath) ICD-10-CM: R06.02  ICD-9-CM: 786.05  Unknown - Present        Thyroid disorder ICD-10-CM: E07.9  ICD-9-CM: 246. 9  Unknown - Present              Medications reviewed  Current Facility-Administered Medications   Medication Dose Route Frequency    aspirin delayed-release tablet 81 mg  81 mg Oral DAILY    atorvastatin (LIPITOR) tablet 40 mg  40 mg Oral DAILY    gabapentin (NEURONTIN) capsule 100 mg  100 mg Oral TID    insulin lispro protamine/insulin lispro (HUMALOG MIX 75/25) injection 24 Units  24 Units SubCUTAneous BID    levothyroxine (SYNTHROID) tablet 150 mcg  150 mcg Oral ACB    magnesium oxide (MAG-OX) tablet 400 mg  400 mg Oral TID    melatonin tablet 3 mg  3 mg Oral QHS PRN    metFORMIN ER (GLUCOPHAGE XR) tablet 750 mg  750 mg Oral BID WITH MEALS    pantoprazole (PROTONIX) tablet 40 mg  40 mg Oral DAILY    potassium chloride SR (KLOR-CON 10) tablet 20 mEq  20 mEq Oral DAILY    polyethylene glycol (MIRALAX) packet 17 g  17 g Oral PRN    sotaloL (BETAPACE) tablet 40 mg  40 mg Oral DAILY    azithromycin (ZITHROMAX) 500 mg in 0.9% sodium chloride 250 mL (VIAL-MATE)  500 mg IntraVENous Q24H    ticagrelor (BRILINTA) tablet 90 mg  90 mg Oral Q12H    remdesivir 200 mg in 0.9% sodium chloride 250 mL IVPB  200 mg IntraVENous ONCE    Followed by   Sheryn Form ON 2021] remdesivir 100 mg in 0.9% sodium chloride 250 mL IVPB  100 mg IntraVENous Q24H    [START ON 2021] dexamethasone (DECADRON) 4 mg/mL injection 6 mg  6 mg IntraVENous Q24H    cholecalciferol (VITAMIN D3) (1000 Units /25 mcg) tablet 2 Tab  2,000 Units Oral DAILY    ascorbic acid (vitamin C) (VITAMIN C) tablet 1,000 mg  1,000 mg Oral DAILY    zinc sulfate (ZINCATE) 220 (50) mg capsule 1 Cap  1 Cap Oral DAILY    ondansetron (ZOFRAN) injection 4 mg  4 mg IntraVENous Q6H PRN    acetaminophen (TYLENOL) tablet 650 mg  650 mg Oral Q6H PRN    furosemide (LASIX) injection 40 mg  40 mg IntraVENous Q12H    docusate sodium (COLACE) capsule 100 mg  100 mg Oral PRN    insulin lispro (HUMALOG) injection   SubCUTAneous AC&HS    glucose chewable tablet 16 g  4 Tab Oral PRN    dextrose (D50W) injection syrg 12.5-25 g  25-50 mL IntraVENous PRN    glucagon (GLUCAGEN) injection 1 mg  1 mg IntraMUSCular PRN       Review of Systems:   A comprehensive review of systems was negative except for that written in the HPI. Objective:   Physical Exam:     Visit Vitals  BP (!) 141/92 (BP 1 Location: Right arm, BP Patient Position: At rest)   Pulse 80   Temp 98.1 °F (36.7 °C)   Resp 20   Ht 5' 2\" (1.575 m)   Wt 78 kg (172 lb)   SpO2 94%   Breastfeeding No   BMI 31.46 kg/m²    O2 Flow Rate (L/min): 15 l/min O2 Device: Hi flow nasal cannula    Temp (24hrs), Av.5 °F (36.9 °C), Min:98.1 °F (36.7 °C), Max:98.9 °F (37.2 °C)    No intake/output data recorded.  1901 -  0700  In: 0   Out: 450 [Urine:450]    General:   Awake and alert   Lungs:   Clear to auscultation bilaterally. Chest wall:  No tenderness or deformity. Heart:  Regular rate and rhythm, S1, S2 normal, no murmur, click, rub or gallop.    Abdomen: Soft, non-tender. Bowel sounds normal. No masses,  No organomegaly. Extremities: Extremities normal, atraumatic, no cyanosis or edema. Pulses: 2+ and symmetric all extremities. Skin: Skin color, texture, turgor normal. No rashes or lesions   Neurologic: CNII-XII intact. No gross focal deficits         Data Review:       Recent Days:  Recent Labs     01/21/21  1530   WBC 9.1   HGB 12.6   HCT 38.1   *     Recent Labs     01/21/21  1530   *   K 3.6   CL 99   CO2 26   *   BUN 20   CREA 0.91   CA 9.0   ALB 2.7*   TBILI 0.8   ALT 37     No results for input(s): PH, PCO2, PO2, HCO3, FIO2 in the last 72 hours. 24 Hour Results:  Recent Results (from the past 24 hour(s))   EKG, 12 LEAD, INITIAL    Collection Time: 01/21/21  2:06 PM   Result Value Ref Range    Ventricular Rate 79 BPM    Atrial Rate 79 BPM    P-R Interval 156 ms    QRS Duration 164 ms    Q-T Interval 444 ms    QTC Calculation (Bezet) 509 ms    Calculated P Axis 69 degrees    Calculated R Axis -58 degrees    Calculated T Axis 122 degrees    Diagnosis       Atrial-sensed ventricular-paced rhythm  Abnormal ECG  When compared with ECG of 20-NOV-2020 10:57,  Vent. rate has increased BY  19 BPM  Confirmed by [place], [address] ([id number]) on 1/21/2021 5:07:24 PM     CBC WITH AUTOMATED DIFF    Collection Time: 01/21/21  3:30 PM   Result Value Ref Range    WBC 9.1 3.6 - 11.0 K/uL    RBC 4.25 3.80 - 5.20 M/uL    HGB 12.6 11.5 - 16.0 g/dL    HCT 38.1 35.0 - 47.0 %    MCV 89.6 80.0 - 99.0 FL    MCH 29.6 26.0 - 34.0 PG    MCHC 33.1 30.0 - 36.5 g/dL    RDW 17.0 (H) 11.5 - 14.5 %    PLATELET 706 (L) 851 - 400 K/uL    MPV 10.7 8.9 - 12.9 FL    NEUTROPHILS 76 (H) 32 - 75 %    LYMPHOCYTES 15 12 - 49 %    MONOCYTES 9 5 - 13 %    EOSINOPHILS 0 0 - 7 %    BASOPHILS 0 0 - 1 %    IMMATURE GRANULOCYTES 0 0.0 - 0.5 %    ABS. NEUTROPHILS 6.9 1.8 - 8.0 K/UL    ABS. LYMPHOCYTES 1.4 0.8 - 3.5 K/UL    ABS. MONOCYTES 0.8 0.0 - 1.0 K/UL    ABS.  EOSINOPHILS 0.0 0.0 - 0.4 K/UL    ABS. BASOPHILS 0.0 0.0 - 0.1 K/UL    ABS. IMM. GRANS. 0.0 0.00 - 0.04 K/UL    DF AUTOMATED     METABOLIC PANEL, COMPREHENSIVE    Collection Time: 01/21/21  3:30 PM   Result Value Ref Range    Sodium 134 (L) 136 - 145 mmol/L    Potassium 3.6 3.5 - 5.1 mmol/L    Chloride 99 97 - 108 mmol/L    CO2 26 21 - 32 mmol/L    Anion gap 9 5 - 15 mmol/L    Glucose 273 (H) 65 - 100 mg/dL    BUN 20 6 - 20 mg/dL    Creatinine 0.91 0.55 - 1.02 mg/dL    BUN/Creatinine ratio 22 (H) 12 - 20      GFR est AA >60 >60 ml/min/1.73m2    GFR est non-AA >60 >60 ml/min/1.73m2    Calcium 9.0 8.5 - 10.1 mg/dL    Bilirubin, total 0.8 0.2 - 1.0 mg/dL    AST (SGOT) 56 (H) 15 - 37 U/L    ALT (SGPT) 37 12 - 78 U/L    Alk.  phosphatase 127 (H) 45 - 117 U/L    Protein, total 7.9 6.4 - 8.2 g/dL    Albumin 2.7 (L) 3.5 - 5.0 g/dL    Globulin 5.2 (H) 2.0 - 4.0 g/dL    A-G Ratio 0.5 (L) 1.1 - 2.2     TROPONIN I    Collection Time: 01/21/21  3:30 PM   Result Value Ref Range    Troponin-I, Qt. 0.38 (H) <0.05 ng/mL   BNP    Collection Time: 01/21/21  4:06 PM   Result Value Ref Range    NT pro-BNP 3,069 (H) <125 pg/mL   SARS-COV-2    Collection Time: 01/21/21  6:25 PM   Result Value Ref Range    SARS-CoV-2 Please find results under separate order     COVID-19 RAPID TEST    Collection Time: 01/21/21  6:25 PM   Result Value Ref Range    Specimen source Nasopharyngeal      COVID-19 rapid test DETECTED (A) Not Detected     TROPONIN I    Collection Time: 01/21/21  6:30 PM   Result Value Ref Range    Troponin-I, Qt. 0.37 (H) <0.05 ng/mL   GLUCOSE, POC    Collection Time: 01/21/21 11:39 PM   Result Value Ref Range    Glucose (POC) 225 (H) 65 - 100 mg/dL    Performed by [facility]    GLUCOSE, POC    Collection Time: 01/22/21  3:16 AM   Result Value Ref Range    Glucose (POC) 208 (H) 65 - 100 mg/dL    Performed by [address] [place] [address], POC    Collection Time: 01/22/21  8:01 AM   Result Value Ref Range    Glucose (POC) 273 (H) 65 - 100 mg/dL Performed by SAE GOETZ        XR CHEST SNGL V   Final Result      CT CHEST WO CONT    (Results Pending)        Assessment:  COVID-19 with pneumonitis    Acute respiratory failure with hypoxia    Acute on chronic systolic heart failure, EF 45%     Elevated troponin, probably more likely due to hypoxia/CHF     Coronary artery disease with previous PCI     Paroxysmal atrial fibrillation     AICD in situ     Hypertension     Diabetes mellitus type 2     GERD with history of Rosario's esophagus    Plan:  Start remdesivir, Decadron, vitamin C, zinc, azithromycin  CT of chest without contrast  Continue IV furosemide  Pulmonology consultation  Check ferritin, LDH, CRP, D-dimer, procalcitonin    Care Plan discussed with: Patient/Family    Total time spent with patient: 30 minutes.     Jaya Parrish MD No difficulties

## 2024-07-29 NOTE — OB PROVIDER LABOR PROGRESS NOTE - NS_SUBJECTIVE/OBJECTIVE_OBGYN_ALL_OB_FT
Labor & Delivery Progress Note     Pt seen & examined at bedside for  painful contractions.     T(C): 36.6 (07-29-24 @ 10:30), Max: 37 (07-29-24 @ 07:49)  HR: 68 (07-29-24 @ 08:26) (65 - 68)  BP: 137/65 (07-29-24 @ 10:30) (118/67 - 137/65)  RR: 16 (07-29-24 @ 10:30) (16 - 18)  SpO2: 94% (07-29-24 @ 10:30) (94% - 94%)

## 2024-07-29 NOTE — OB RN DELIVERY SUMMARY - NS_SEPSISRSKCALC_OBGYN_ALL_OB_FT
EOS calculated successfully. EOS Risk Factor: 0.5/1000 live births ([facility] national incidence); GA=39w6d; Temp=98.6; ROM=1.7; GBS='Negative'; Antibiotics='No antibiotics or any antibiotics < 2 hrs prior to birth'

## 2024-07-29 NOTE — DISCHARGE NOTE OB - MEDICATION SUMMARY - MEDICATIONS TO STOP TAKING
I will STOP taking the medications listed below when I get home from the hospital:  None
leonel Ferreira RN

## 2024-07-29 NOTE — OB PROVIDER DELIVERY SUMMARY - NSSELHIDDEN_OBGYN_ALL_OB_FT
[[email or web address]_OBGYN_ALL_OB_FT:[id number]=],[NS_DeliveryRN_OBGYN_ALL_OB_FT:[id number]=]

## 2024-07-30 ENCOUNTER — RESULT REVIEW (OUTPATIENT)
Age: 29
End: 2024-07-30

## 2024-07-30 LAB
ANION GAP SERPL CALC-SCNC: 10 MMOL/L — SIGNIFICANT CHANGE UP (ref 7–14)
BUN SERPL-MCNC: 8 MG/DL — SIGNIFICANT CHANGE UP (ref 7–23)
CALCIUM SERPL-MCNC: 8.2 MG/DL — LOW (ref 8.4–10.5)
CHLORIDE SERPL-SCNC: 108 MMOL/L — HIGH (ref 98–107)
CO2 SERPL-SCNC: 22 MMOL/L — SIGNIFICANT CHANGE UP (ref 22–31)
CORTIS AM PEAK SERPL-MCNC: 24.8 UG/DL — HIGH (ref 6–18.4)
CREAT SERPL-MCNC: 0.6 MG/DL — SIGNIFICANT CHANGE UP (ref 0.5–1.3)
EGFR: 125 ML/MIN/1.73M2 — SIGNIFICANT CHANGE UP
GLUCOSE SERPL-MCNC: 108 MG/DL — HIGH (ref 70–99)
HCT VFR BLD CALC: 27.7 % — LOW (ref 34.5–45)
HCT VFR BLD CALC: 27.8 % — LOW (ref 34.5–45)
HGB BLD-MCNC: 9.1 G/DL — LOW (ref 11.5–15.5)
HGB BLD-MCNC: 9.2 G/DL — LOW (ref 11.5–15.5)
MCHC RBC-ENTMCNC: 32.9 GM/DL — SIGNIFICANT CHANGE UP (ref 32–36)
MCHC RBC-ENTMCNC: 33.1 GM/DL — SIGNIFICANT CHANGE UP (ref 32–36)
MCHC RBC-ENTMCNC: 33.9 PG — SIGNIFICANT CHANGE UP (ref 27–34)
MCHC RBC-ENTMCNC: 34.2 PG — HIGH (ref 27–34)
MCV RBC AUTO: 102.6 FL — HIGH (ref 80–100)
MCV RBC AUTO: 104.1 FL — HIGH (ref 80–100)
NRBC # BLD: 0 /100 WBCS — SIGNIFICANT CHANGE UP (ref 0–0)
NRBC # BLD: 0 /100 WBCS — SIGNIFICANT CHANGE UP (ref 0–0)
NRBC # FLD: 0 K/UL — SIGNIFICANT CHANGE UP (ref 0–0)
NRBC # FLD: 0 K/UL — SIGNIFICANT CHANGE UP (ref 0–0)
PLATELET # BLD AUTO: 155 K/UL — SIGNIFICANT CHANGE UP (ref 150–400)
PLATELET # BLD AUTO: 160 K/UL — SIGNIFICANT CHANGE UP (ref 150–400)
POTASSIUM SERPL-MCNC: 4.2 MMOL/L — SIGNIFICANT CHANGE UP (ref 3.5–5.3)
POTASSIUM SERPL-SCNC: 4.2 MMOL/L — SIGNIFICANT CHANGE UP (ref 3.5–5.3)
RBC # BLD: 2.66 M/UL — LOW (ref 3.8–5.2)
RBC # BLD: 2.71 M/UL — LOW (ref 3.8–5.2)
RBC # FLD: 12.9 % — SIGNIFICANT CHANGE UP (ref 10.3–14.5)
RBC # FLD: 13 % — SIGNIFICANT CHANGE UP (ref 10.3–14.5)
SODIUM SERPL-SCNC: 140 MMOL/L — SIGNIFICANT CHANGE UP (ref 135–145)
TSH SERPL-MCNC: 3.21 UIU/ML — SIGNIFICANT CHANGE UP (ref 0.27–4.2)
WBC # BLD: 17.12 K/UL — HIGH (ref 3.8–10.5)
WBC # BLD: 17.85 K/UL — HIGH (ref 3.8–10.5)
WBC # FLD AUTO: 17.12 K/UL — HIGH (ref 3.8–10.5)
WBC # FLD AUTO: 17.85 K/UL — HIGH (ref 3.8–10.5)

## 2024-07-30 PROCEDURE — 93306 TTE W/DOPPLER COMPLETE: CPT | Mod: 26

## 2024-07-30 PROCEDURE — 93010 ELECTROCARDIOGRAM REPORT: CPT

## 2024-07-30 PROCEDURE — 99222 1ST HOSP IP/OBS MODERATE 55: CPT

## 2024-07-30 RX ORDER — DEXTROSE MONOHYDRATE, SODIUM CHLORIDE, SODIUM LACTATE, CALCIUM CHLORIDE, MAGNESIUM CHLORIDE 1.5; 538; 448; 18.4; 5.08 G/100ML; MG/100ML; MG/100ML; MG/100ML; MG/100ML
1000 SOLUTION INTRAPERITONEAL ONCE
Refills: 0 | Status: COMPLETED | OUTPATIENT
Start: 2024-07-30 | End: 2024-07-29

## 2024-07-30 RX ORDER — DEXTROSE MONOHYDRATE, SODIUM CHLORIDE, SODIUM LACTATE, CALCIUM CHLORIDE, MAGNESIUM CHLORIDE 1.5; 538; 448; 18.4; 5.08 G/100ML; MG/100ML; MG/100ML; MG/100ML; MG/100ML
500 SOLUTION INTRAPERITONEAL ONCE
Refills: 0 | Status: COMPLETED | OUTPATIENT
Start: 2024-07-30 | End: 2024-07-30

## 2024-07-30 RX ORDER — IBUPROFEN 200 MG
600 TABLET ORAL EVERY 6 HOURS
Refills: 0 | Status: DISCONTINUED | OUTPATIENT
Start: 2024-07-30 | End: 2024-07-31

## 2024-07-30 RX ORDER — ACETAMINOPHEN 500 MG
1000 TABLET ORAL ONCE
Refills: 0 | Status: COMPLETED | OUTPATIENT
Start: 2024-07-30 | End: 2024-07-30

## 2024-07-30 RX ADMIN — Medication 1 TABLET(S): at 12:58

## 2024-07-30 RX ADMIN — Medication 600 MILLIGRAM(S): at 14:29

## 2024-07-30 RX ADMIN — Medication 3 MILLILITER(S): at 02:30

## 2024-07-30 RX ADMIN — Medication 3 MILLILITER(S): at 22:51

## 2024-07-30 RX ADMIN — Medication 600 MILLIGRAM(S): at 18:27

## 2024-07-30 RX ADMIN — Medication 3 MILLILITER(S): at 14:29

## 2024-07-30 RX ADMIN — Medication 975 MILLIGRAM(S): at 15:50

## 2024-07-30 RX ADMIN — Medication 3 MILLILITER(S): at 07:18

## 2024-07-30 RX ADMIN — DEXTROSE MONOHYDRATE, SODIUM CHLORIDE, SODIUM LACTATE, CALCIUM CHLORIDE, MAGNESIUM CHLORIDE 100 MILLILITER(S): 1.5; 538; 448; 18.4; 5.08 SOLUTION INTRAPERITONEAL at 18:28

## 2024-07-30 RX ADMIN — Medication 400 MILLIGRAM(S): at 02:20

## 2024-07-30 RX ADMIN — Medication 600 MILLIGRAM(S): at 12:58

## 2024-07-30 RX ADMIN — Medication 975 MILLIGRAM(S): at 16:23

## 2024-07-30 RX ADMIN — Medication 975 MILLIGRAM(S): at 08:30

## 2024-07-30 RX ADMIN — Medication 600 MILLIGRAM(S): at 23:24

## 2024-07-30 RX ADMIN — Medication 975 MILLIGRAM(S): at 09:35

## 2024-07-30 RX ADMIN — Medication 1000 MILLIGRAM(S): at 03:00

## 2024-07-30 NOTE — CONSULT NOTE ADULT - ASSESSMENT
Patient is a 29 year old female patients post partum day 1 who had course complicated by post delivery orthostatic hypotension in the setting of a drop in hemoglobin from 13 to 9. The patient had tachycardia to 170 with standing and continued to have tachycardia after 1-2 L volume resuscitation. Cardiology consulted for tachycardia and orthostatic hypotension. Most likely tachycardia is in the setting of acute blood loss.     EKG: NSR w/o ST changes, exam with rate variation with respiration.     Recommendations:   - am Cortisol response appropriate (24)   - Check TTE   - 500 cc IVF over 5 hours   - Recheck orthostatic vitals post IVF   - Trend CBC   - Repeat EKG     Demetrius Augustine MD   Cardiology Fellow  Patient is a 29 year old female patients post partum day 1 who had course complicated by post delivery orthostatic hypotension in the setting of a drop in hemoglobin from 13 to 9. The patient had tachycardia to 170 with standing and continued to have tachycardia after 1-2 L volume resuscitation. Cardiology consulted for tachycardia and orthostatic hypotension. Most likely tachycardia is in the setting of acute blood loss and intravascular depletion.    EKG: NSR w/o ST changes, exam with rate variation with respiration.     Recommendations:   - am Cortisol response appropriate (24)   - Check TTE   - 500 cc IVF over 5 hours   - Recheck orthostatic vitals post IVF   - Trend CBC   - Repeat EKG to check for any dynamic change     Demetrius Augustine MD   Cardiology Fellow

## 2024-07-30 NOTE — CONSULT NOTE ADULT - ATTENDING COMMENTS
Noted orthostatics overnight, overall improved, from prior  Would monitor today, encourage hydration, nutrition

## 2024-07-30 NOTE — OB POSTPARTUM EVENT NOTE - NS_EVENTSUMMARY4_OBGYN_ALL_OB_FT
4th attempt at orthostatics  fundus firm however noted to be deviated to the right bleeding moderate   orthos attempted to assist pt to bathroom to void  138/67 18 154 99% pt states palpitation and dizzy  bedpan given unable to void and doesn't have urge at this time to void  0230 bedside sono by Dr. Reis   unremarkable as per Dr Bojorquez  0238 montanez placed 500cc immediate urine noted, clamped at this time to prevent bladder spasm  0310 montanez unclamped and 500cc of urine noted and reclamped  0320 cbc drawn and sent  0340 montanez unclamped and 600cc noted and reclamped

## 2024-07-30 NOTE — OB POSTPARTUM EVENT NOTE - NS_EVENTSUMMARY3_OBGYN_ALL_OB_FT
3rd attempted failed orthostatic Bps  light headed dizzy 3rd attempted failed orthostatic Bps  light headed dizzy palpitations

## 2024-07-30 NOTE — LACTATION INITIAL EVALUATION - INTERVENTION OUTCOME
Lots of Motivation given. Mother and Infant roomed-in.   Mother educated on safe skin to skin care, safe sleep practices and environment. Call bell within reach./verbalizes understanding/demonstrates understanding of teaching/good return demonstration

## 2024-07-30 NOTE — CONSULT NOTE ADULT - SUBJECTIVE AND OBJECTIVE BOX
Cardiology Consult Note   [Please check [email or web address] password: [email or web address]" for cardiology service schedule and contact information]    HPI:  PNC with Dr Higgins   Patient is a 28 y/o with no pmh who presented for vaginal infant delivery and had post operative orthostatic hypotension. The patient has no known cardiac history and has no known family history of cardiac disease. She had loss of 300cc reported during vaginal bleeding, but had a drop of hemoglobin of 13 -> 9. She had orthostatic hypotension even after 2L of IVF resuscitaiton. Her heart rates would increase into the 170s. THe patient is highly functional and denies any chest pain, sob, palpitations or syncope or presyncopal episodes.     She reports an excellent exercise tolerance with no issues including higher intensity activities such as weight lifting or running. She has not had any prior symptoms of dizziness or issues with changes in position. Cardiology was consulted for the dizziness.       PAST MEDICAL & SURGICAL HISTORY:  No pertinent past medical history      H/O unilateral salpingectomy        FAMILY HISTORY: None     SOCIAL HISTORY:  unchanged    MEDICATIONS: None         acetaminophen     Tablet .. 975 milliGRAM(s) Oral <User Schedule>  diphenhydrAMINE 25 milliGRAM(s) Oral every 6 hours PRN  ibuprofen  Tablet. 600 milliGRAM(s) Oral every 6 hours  oxyCODONE    IR 5 milliGRAM(s) Oral every 3 hours PRN  oxyCODONE    IR 5 milliGRAM(s) Oral once PRN    magnesium hydroxide Suspension 30 milliLiter(s) Oral two times a day PRN  simethicone 80 milliGRAM(s) Chew every 4 hours PRN      benzocaine 20%/menthol 0.5% Spray 1 Spray(s) Topical every 6 hours PRN  dibucaine 1% Ointment 1 Application(s) Topical every 6 hours PRN  diphtheria/tetanus/pertussis (acellular) Vaccine (Adacel) 0.5 milliLiter(s) IntraMuscular once  hydrocortisone 1% Cream 1 Application(s) Topical every 6 hours PRN  lanolin Ointment 1 Application(s) Topical every 6 hours PRN  oxytocin Infusion 41.667 milliUNIT(s)/Min IV Continuous <Continuous>  pramoxine 1%/zinc 5% Cream 1 Application(s) Topical every 4 hours PRN  prenatal multivitamin 1 Tablet(s) Oral daily  sodium chloride 0.9% lock flush 3 milliLiter(s) IV Push every 8 hours  witch hazel Pads 1 Application(s) Topical every 4 hours PRN        -------------------------------------------------------------------------------------------  PHYSICAL EXAM:  T(C): 37.1 (07-30-24 @ 13:36), Max: 37.1 (07-30-24 @ 11:41)  HR: 86 (07-30-24 @ 13:36) (67 - 162)  BP: 119/52 (07-30-24 @ 13:36) (94/53 - 138/67)  RR: 19 (07-30-24 @ 13:36) (18 - 19)  SpO2: 99% (07-30-24 @ 13:36) (88% - 100%)  Wt(kg): --  I&O's Summary    29 Jul 2024 07:01  -  30 Jul 2024 07:00  --------------------------------------------------------  IN: 4500 mL / OUT: 3277 mL / NET: 1223 mL    30 Jul 2024 07:01  -  30 Jul 2024 21:01  --------------------------------------------------------  IN: 0 mL / OUT: 1700 mL / NET: -1700 mL        GENERAL: NAD  HEAD: Atraumatic, Normocephalic.  ENT: Moist mucous membranes.  NECK: Supple, No JVD.  CHEST/LUNG: Clear to auscultation bilaterally; No rales, rhonchi, wheezing, or rubs. Unlabored respirations.  HEART: Regular rate and rhythm; No murmurs, rubs, or gallops.  ABDOMEN: Bowel sounds present; Soft, Nontender, Nondistended.   EXTREMITIES:  2+ Peripheral Pulses, brisk capillary refill. No clubbing, cyanosis, or edema.    -------------------------------------------------------------------------------------------  LABS:                          9.1    17.85 )-----------( 160      ( 30 Jul 2024 09:55 )             27.7     07-30    140  |  108<H>  |  8   ----------------------------<  108<H>  4.2   |  22  |  0.60    Ca    8.2<L>      30 Jul 2024 09:55                acetaminophen     Tablet .. 975 milliGRAM(s) Oral <User Schedule>  diphenhydrAMINE 25 milliGRAM(s) Oral every 6 hours PRN  ibuprofen  Tablet. 600 milliGRAM(s) Oral every 6 hours  oxyCODONE    IR 5 milliGRAM(s) Oral every 3 hours PRN  oxyCODONE    IR 5 milliGRAM(s) Oral once PRN      -------------------------------------------------------------------------------------------  Cardiovascular Diagnostic Testing:    ECG:  NSR reviewed, no ST changes     Echo:     Stress Testing:    Cath:    -------------------------------------------------------------------------------------------

## 2024-07-30 NOTE — CHART NOTE - NSCHARTNOTEFT_GEN_A_CORE
OB Attending Note     Patient remains symptomatic when standing.   Repeat evaluation at bedside shows patient to be lying comfortably in bed, NAD.   She reports when lying down she feels well.  Upon standing she feels dizzy, lightheaded and palpitations.   Bedside sono performed and revealed thin uterine stripe.  Full bladder noted.  Patient reports no sensation to urinate.   HR 70's.  BP wnl.   O2 sat 9*% on room air.  Fundus firm and non tender.     Discussed with patient I believe currently she is under resuscitated from blood loss at delivery.  Patient experiencing symptomatic acute blood loss anemia.  No indication of exam of any continued active bleeding.   Lochia minimal.  Thin uterine stripe.  Perineum without evidence of hematoma.  Given Hg 9 no indication for blood transfusion at this time.      Plan:   -repeat CBC   -Additional 1 L bolus   -Straight catheterization     Continue to closely monitor     GER Bojorquez MD
OB Attending Note    Patient evaluated postpartum due to failed orthostatics.   When lying in bed patient feels well w/o complaints.    Patient denies HA, lightheadness, dizziness, CP/SOB, n/v.   However, upon standing for orthostatics, patient reports feeling dizzy, lightheaded and palpitations and HR increases to 130-160.       Vital Signs Last 24 Hrs  T(C): 37.5 (2024 17:24), Max: 37.5 (2024 17:24)  HR: 80 (2024 22:37) (59 - 146)  BP: 123/58 (2024 22:37) (92/55 - 141/73)  RR: 18 (2024 17:24) (16 - 18)  SpO2: 100% (2024 22:36) (88% - 100%)                            13.3   16.49 )-----------( 161      ( 2024 08:15 )             39.4         Physical Exam:   General: sitting comfortably in bed, NAD   Abdomen: fundus firm, non-tender, non distended.      Vaginal: scant vaginal blood on pad.  No active vaginal bleeding.  Vaginal laceration inspected and noted to be edematous however w/o induration or bulge.   Rectum:  gentle rectal exam performed with no bulge felt   Extremities: non tender b/l, no edema.      Assessment:   Patient is a now P1 sp  with failed orthostatics x 3.   Differential includes acute blood loss anemia vs. hematoma vs. dehydration.   No current active bleeding on exam.  No signs/symptoms of hematoma on exam.  Suspect underresuscitation from acute blood loss anemia from delivery.   -for stat CBC and lactate   -increase PO hydration   -continue to closely monitor vitals     GER Bojorquez MD
OB Attending Note    Patient re-evaluated at bedside.  Sitting comfortably in bed, feeding .   Reports continued symptoms of lightheadness, dizziness, palpitations on standing.  No symptoms while sitting in bed.  Repeat CBC reviewed and stable from prior.   Lochia remains minimal.  Fundus firm and non tender.    Will consult cardiology for continued symptomatic orthostatic hypotension despite stable CBC and fluid resuscitation (now sp 2 L bolus).   montanez in place due to urinary retention and draining copious amounts of clear urine.  Patient tolerating PO intake w/o issue.  Continue PO hydration.  To remain for observation on L&D floor until cardiology consult complete.      GER Bojorquez MD
OB Attending Note    Patient seen and evaluated at bedside.   Denies complaints.     SVE 8/90/-2   AROM performed- clear fluid     /mod mamta/+accel,-decel  [place] ctx q2-3 mins    A/P P0 presents in labor.     -Labor: continue pitocin augmentation.   sp AROM.  Patient repositioned to flying cowgirl position given high station  -FEtus: category 1 tracing  -Analgesia: continue epidural     GER Bojorquez MD
Pt seen at bedside for reevaluation. S/p failed orthostatic vitals x5 episodes associated with lightheadedness on standing with tachycardia to the 170s at one point.  Reporting feeling better overall. Sitting up feeding , denies dizziness, lightheadedness. Pain well controlled.  Reporting mild perineal soreness, no severe perineal, rectal or back pain.     Vital Signs Last 24 Hrs  T(C): 37.5 (24 @ 17:24), Max: 37.5 (24 @ 17:24)  T(F): 99.5 (24 @ 17:24), Max: 99.5 (24 @ 17:24)  HR: 104 (24 @ 10:31) (59 - 162)  BP: 121/67 (24 @ 10:03) (92/55 - 141/73)  BP(mean): --  RR: 18 (24 @ 17:24) (16 - 18)  SpO2: 99% (24 @ 10:36) (88% - 100%)    Gen: well appearing  abd: fundus firm, midline, nontender. Abdomen soft, nondistended.     FAST scan: negative.   thin uterine stripe noted.     EKG reviewed: sinus rhythm    Assessment/Plan:   In brief this is a 28yo  PPD#1 s/p  with  and 2nd degree laceration. S/p failed orthostatic vitals x5 episodes with symptomatic tachycardia and lightheadedness with standing. S/p 2L boluses LR overnight. H/h stable x3 . FAST scan negative for intraabdominal bleeding.  DDx includes hypovolemia vs  under quantification of QBL w/ symptomatic acute blood loss anemia vs lower concern for pelvic or intraabdominal hematoma vs cardiac etiology.  As of 11am, patient now symptomatically improved. S/p breakfast.  Orthos remain positive with jump in HR from 90 to 140 however pt stood for >5 min without dizziness/lightheadedness.   - Cardiology consulted, recommending repeat CBC and BMP/TSH/cortisol pending final recs.   - continue to encourage PO hydration  - will continue to monitor, consider PRBC transfusion if continues to be symptomatic  - patient to be transferred to postpartum unit    Case discussed on L&D safety rounds    D/w Dr Geraldine Louis-Drew PGY4
OB Attending Note    Patient seen and evaluated at bedside.  Comfortable after epidural.     SVE 6/70/-3     /mod mamta/+accel,-decel  [place]: ctx q6 mins    A/P P0 presents in labor  -consider AROM vs. pitocin augmentation.  discussed options with patient.  risks/benefits.   will consider  -Fetus: category 1  -Analgesia; sp epidural     GER Gomez MD

## 2024-07-30 NOTE — LACTATION INITIAL EVALUATION - LACTATION INTERVENTIONS
Mom educated about babies less than 24 hours of age will be sleepy. Made aware of cluster feeding that occurs after 24 hours of life and to be cautious of sleep deprivation in order to maintain infant and mother safety. Instructed to place infant in bassinet or call for assistance if feeling sleepy or tired.Recognition of feeding cues and to feed the baby on demand based on cues at least 8-12 times in a day. Instructed pt. to wake the baby to feed if no feeding cues are seen within 3h since prior feed. Pt. educated on the nutritional needs of the baby, how many wet and dirty diapers to expect, along with the amount of times the baby needs to be placed on the breast at this time.  use  feeding log to record feedings along with wet and dirty diapers. instructed in hand expression with good return demonstration.  Reviewed safe skin to skin. Verbalized understanding of education. Encouraged to breastfeed the baby on demand based on cues and at least 8-12 times in a day. Instructed to log feedings along with wet and dirty diapers. Instructed in hand expression with+ colostrum noted.  Assisted mom with latch and positioning. Encouraged deeper latch. Reviewed proper positioning no matter what position she chooses to use: belly to belly, alignment, and supporting the head and shoulders./initiate/review safe skin-to-skin/initiate/review hand expression/initiate/review techniques for position and latch/review techniques to increase milk supply/review techniques to manage sore nipples/engorgement/initiate/review breast massage/compression/initiate/review alternate feeding method/reviewed components of an effective feeding and at least 8 effective feedings per day required/reviewed importance of monitoring infant diapers, the breastfeeding log, and minimum output each day/reviewed risks of unnecessary formula supplementation/reviewed risks of artificial nipples/reviewed strategies to transition to breastfeeding only/reviewed benefits and recommendations for rooming in/reviewed feeding on demand/by cue at least 8 times a day/recommended follow-up with pediatrician within 24 hours of discharge/reviewed indications of inadequate milk transfer that would require supplementation

## 2024-07-31 VITALS
OXYGEN SATURATION: 100 % | DIASTOLIC BLOOD PRESSURE: 63 MMHG | TEMPERATURE: 98 F | HEART RATE: 70 BPM | SYSTOLIC BLOOD PRESSURE: 121 MMHG | RESPIRATION RATE: 18 BRPM

## 2024-07-31 LAB
BASOPHILS # BLD AUTO: 0.12 K/UL — SIGNIFICANT CHANGE UP (ref 0–0.2)
BASOPHILS NFR BLD AUTO: 0.7 % — SIGNIFICANT CHANGE UP (ref 0–2)
CORTIS AM PEAK SERPL-MCNC: 22.8 UG/DL — HIGH (ref 6–18.4)
EOSINOPHIL # BLD AUTO: 0.13 K/UL — SIGNIFICANT CHANGE UP (ref 0–0.5)
EOSINOPHIL NFR BLD AUTO: 0.7 % — SIGNIFICANT CHANGE UP (ref 0–6)
HCT VFR BLD CALC: 27.9 % — LOW (ref 34.5–45)
HGB BLD-MCNC: 9.1 G/DL — LOW (ref 11.5–15.5)
IANC: 12.75 K/UL — HIGH (ref 1.8–7.4)
IMM GRANULOCYTES NFR BLD AUTO: 4.1 % — HIGH (ref 0–0.9)
LYMPHOCYTES # BLD AUTO: 15.5 % — SIGNIFICANT CHANGE UP (ref 13–44)
LYMPHOCYTES # BLD AUTO: 2.7 K/UL — SIGNIFICANT CHANGE UP (ref 1–3.3)
MCHC RBC-ENTMCNC: 32.6 GM/DL — SIGNIFICANT CHANGE UP (ref 32–36)
MCHC RBC-ENTMCNC: 33.7 PG — SIGNIFICANT CHANGE UP (ref 27–34)
MCV RBC AUTO: 103.3 FL — HIGH (ref 80–100)
MONOCYTES # BLD AUTO: 1 K/UL — HIGH (ref 0–0.9)
MONOCYTES NFR BLD AUTO: 5.7 % — SIGNIFICANT CHANGE UP (ref 2–14)
NEUTROPHILS # BLD AUTO: 12.75 K/UL — HIGH (ref 1.8–7.4)
NEUTROPHILS NFR BLD AUTO: 73.3 % — SIGNIFICANT CHANGE UP (ref 43–77)
NRBC # BLD: 0 /100 WBCS — SIGNIFICANT CHANGE UP (ref 0–0)
NRBC # FLD: 0 K/UL — SIGNIFICANT CHANGE UP (ref 0–0)
PLATELET # BLD AUTO: 182 K/UL — SIGNIFICANT CHANGE UP (ref 150–400)
RBC # BLD: 2.7 M/UL — LOW (ref 3.8–5.2)
RBC # FLD: 13.2 % — SIGNIFICANT CHANGE UP (ref 10.3–14.5)
T PALLIDUM AB TITR SER: NEGATIVE — SIGNIFICANT CHANGE UP
WBC # BLD: 17.42 K/UL — HIGH (ref 3.8–10.5)
WBC # FLD AUTO: 17.42 K/UL — HIGH (ref 3.8–10.5)

## 2024-07-31 RX ADMIN — Medication 600 MILLIGRAM(S): at 11:43

## 2024-07-31 RX ADMIN — Medication 600 MILLIGRAM(S): at 12:38

## 2024-07-31 RX ADMIN — Medication 3 MILLILITER(S): at 05:07

## 2024-07-31 RX ADMIN — Medication 975 MILLIGRAM(S): at 09:30

## 2024-07-31 RX ADMIN — Medication 600 MILLIGRAM(S): at 00:07

## 2024-07-31 RX ADMIN — Medication 975 MILLIGRAM(S): at 03:17

## 2024-07-31 RX ADMIN — Medication 1 TABLET(S): at 11:43

## 2024-07-31 RX ADMIN — Medication 975 MILLIGRAM(S): at 02:41

## 2024-07-31 RX ADMIN — Medication 975 MILLIGRAM(S): at 08:38

## 2024-07-31 NOTE — PROGRESS NOTE ADULT - ATTENDING COMMENTS
Patient seen and agree with above  Anemia of acute blood loss - stable and asymptomatic  DAXA normal  stable for discharge  DINO Beltran

## 2024-07-31 NOTE — PROGRESS NOTE ADULT - ASSESSMENT
A/P: 30yo PPD#2 s/p . Patient failed orthostatic vitals x5 on - but clinical picture on  is much improved. Patient is stable and doing well post-partum.      #orthostatic hypotension and tachycardia   -   - Hct: 39.4->27.1->27.8->27.7, f/u AM CBC  - s/p , patient failed orthostatic vitals x5  - s/p 1L bolus x2 + 500cc bolus x1  - BSS(): thin EM stripe  - FAST(): negative  - EKG(): sinus rhythm  - TTE(): mild MR   - Cortisol: 24.8, per cardiology - appropriate cortisol response  - appreciate cardiology recommendations   - f/u AM CBC and continue to monitor vitals closely   - : VSS    #postpartum  - Pain well controlled, continue current pain regimen  - Increase ambulation, SCDs when not ambulating  - Continue regular diet    A Sacks, PGY1

## 2024-07-31 NOTE — PROGRESS NOTE ADULT - SUBJECTIVE AND OBJECTIVE BOX
OB Progress Note:  PPD#2    S: 28yo PPD#2 s/p . Patient feels well. Pain is well controlled. She is tolerating a regular diet and passing flatus. She is voiding spontaneously, and ambulating without difficulty. Denies CP/SOB. Denies lightheadedness/dizziness. Denies N/V. Feels overall improved from yesterday.     O:  Vitals:   Vital Signs Last 24 Hrs  T(C): 36.9 (2024 06:10), Max: 37.1 (2024 11:41)  T(F): 98.5 (2024 06:10), Max: 98.8 (2024 11:41)  HR: 65 (2024 06:10) (65 - 160)  BP: 119/71 (2024 06:10) (113/53 - 121/67)  BP(mean): 87 (2024 06:10) (87 - 87)  RR: 18 (2024 06:10) (18 - 19)  SpO2: 100% (2024 06:10) (97% - 100%)    Parameters below as of 2024 06:10  Patient On (Oxygen Delivery Method): room air        MEDICATIONS  (STANDING):  acetaminophen     Tablet .. 975 milliGRAM(s) Oral <User Schedule>  diphtheria/tetanus/pertussis (acellular) Vaccine (Adacel) 0.5 milliLiter(s) IntraMuscular once  ibuprofen  Tablet. 600 milliGRAM(s) Oral every 6 hours  prenatal multivitamin 1 Tablet(s) Oral daily  sodium chloride 0.9% lock flush 3 milliLiter(s) IV Push every 8 hours    MEDICATIONS  (PRN):  benzocaine 20%/menthol 0.5% Spray 1 Spray(s) Topical every 6 hours PRN for Perineal discomfort  dibucaine 1% Ointment 1 Application(s) Topical every 6 hours PRN Perineal discomfort  diphenhydrAMINE 25 milliGRAM(s) Oral every 6 hours PRN Pruritus  hydrocortisone 1% Cream 1 Application(s) Topical every 6 hours PRN Moderate Pain (4-6)  lanolin Ointment 1 Application(s) Topical every 6 hours PRN nipple soreness  magnesium hydroxide Suspension 30 milliLiter(s) Oral two times a day PRN Constipation  oxyCODONE    IR 5 milliGRAM(s) Oral once PRN Moderate to Severe Pain (4-10)  oxyCODONE    IR 5 milliGRAM(s) Oral every 3 hours PRN Moderate to Severe Pain (4-10)  pramoxine 1%/zinc 5% Cream 1 Application(s) Topical every 4 hours PRN Moderate Pain (4-6)  simethicone 80 milliGRAM(s) Chew every 4 hours PRN Gas  witch hazel Pads 1 Application(s) Topical every 4 hours PRN Perineal discomfort      Labs:  Blood type: O Positive  Rubella IgG: RPR:                           9.1<L>   17.85<H> >-----------< 160    (  @ 09:55 )             27.7<L>                        9.2<L>   17.12<H> >-----------< 155    (  @ 03:20 )             27.8<L>                        9.1<L>   18.23<H> >-----------< 150    (  @ 22:50 )             27.1<L>                        13.3   16.49<H> >-----------< 161    (  @ 08:15 )             39.4    - @ 09:55      140  |  108<H>  |  8   ----------------------------<  108<H>  4.2   |  22  |  0.60        Ca    8.2<L>      2024 09:55            Physical Exam:  General: NAD  Cardio: RRR, no murmurs/rubs/gallops  Pulm: non labored breathing, breath sounds clear to auscultation bilaterally  Abdomen: soft, non-tender, non-distended, fundus firm  Vaginal: Lochia wnl  Extremities: No erythema/edema

## 2025-01-03 ENCOUNTER — APPOINTMENT (OUTPATIENT)
Dept: ORTHOPEDIC SURGERY | Facility: CLINIC | Age: 30
End: 2025-01-03
Payer: COMMERCIAL

## 2025-01-03 DIAGNOSIS — M67.431 GANGLION, RIGHT WRIST: ICD-10-CM

## 2025-01-03 PROCEDURE — 73110 X-RAY EXAM OF WRIST: CPT | Mod: RT

## 2025-01-03 PROCEDURE — 99202 OFFICE O/P NEW SF 15 MIN: CPT | Mod: 25

## 2025-01-03 PROCEDURE — 20612 ASPIRATE/INJ GANGLION CYST: CPT | Mod: RT

## 2025-03-12 ENCOUNTER — RESULT REVIEW (OUTPATIENT)
Age: 30
End: 2025-03-12

## 2025-04-02 ENCOUNTER — RESULT REVIEW (OUTPATIENT)
Age: 30
End: 2025-04-02

## 2025-04-02 ENCOUNTER — APPOINTMENT (OUTPATIENT)
Dept: ULTRASOUND IMAGING | Facility: CLINIC | Age: 30
End: 2025-04-02
Payer: COMMERCIAL

## 2025-04-02 ENCOUNTER — OUTPATIENT (OUTPATIENT)
Dept: OUTPATIENT SERVICES | Facility: HOSPITAL | Age: 30
LOS: 1 days | End: 2025-04-02
Payer: COMMERCIAL

## 2025-04-02 DIAGNOSIS — Z90.79 ACQUIRED ABSENCE OF OTHER GENITAL ORGAN(S): Chronic | ICD-10-CM

## 2025-04-02 DIAGNOSIS — N64.59 OTHER SIGNS AND SYMPTOMS IN BREAST: ICD-10-CM

## 2025-04-02 DIAGNOSIS — Z00.8 ENCOUNTER FOR OTHER GENERAL EXAMINATION: ICD-10-CM

## 2025-04-02 PROCEDURE — 76642 ULTRASOUND BREAST LIMITED: CPT | Mod: 26,RT

## 2025-04-02 PROCEDURE — 76642 ULTRASOUND BREAST LIMITED: CPT
